# Patient Record
Sex: MALE | Race: WHITE | Employment: FULL TIME | URBAN - METROPOLITAN AREA
[De-identification: names, ages, dates, MRNs, and addresses within clinical notes are randomized per-mention and may not be internally consistent; named-entity substitution may affect disease eponyms.]

---

## 2018-09-11 ENCOUNTER — HOSPITAL ENCOUNTER (EMERGENCY)
Age: 28
Discharge: HOME | End: 2018-09-11
Attending: EMERGENCY MEDICINE

## 2018-09-11 VITALS
HEIGHT: 72 IN | RESPIRATION RATE: 16 BRPM | OXYGEN SATURATION: 96 % | WEIGHT: 315 LBS | TEMPERATURE: 98.5 F | HEART RATE: 92 BPM | DIASTOLIC BLOOD PRESSURE: 80 MMHG | BODY MASS INDEX: 42.66 KG/M2 | SYSTOLIC BLOOD PRESSURE: 172 MMHG

## 2018-09-11 DIAGNOSIS — J06.9 ACUTE UPPER RESPIRATORY INFECTION: Primary | ICD-10-CM

## 2018-09-11 PROCEDURE — 99283 EMERGENCY DEPT VISIT LOW MDM: CPT

## 2018-09-11 RX ORDER — FLUTICASONE PROPIONATE 50 MCG
1 SPRAY, SUSPENSION (ML) NASAL DAILY
Qty: 1 BOTTLE | Refills: 0 | Status: SHIPPED | OUTPATIENT
Start: 2018-09-11 | End: 2019-08-28 | Stop reason: ALTCHOICE

## 2018-09-11 RX ORDER — ECHINACEA PURPUREA EXTRACT 125 MG
1 TABLET ORAL PRN
Qty: 1 BOTTLE | Refills: 3 | Status: SHIPPED | OUTPATIENT
Start: 2018-09-11 | End: 2020-12-13 | Stop reason: ALTCHOICE

## 2018-09-11 RX ORDER — PREDNISONE 10 MG/1
40 TABLET ORAL DAILY
Qty: 20 TABLET | Refills: 0 | Status: SHIPPED | OUTPATIENT
Start: 2018-09-11 | End: 2018-09-16

## 2018-09-11 ASSESSMENT — ENCOUNTER SYMPTOMS
WHEEZING: 1
COUGH: 1
CONSTIPATION: 0
SHORTNESS OF BREATH: 0
VOMITING: 0
ABDOMINAL PAIN: 0
BACK PAIN: 0
DIARRHEA: 0
SORE THROAT: 0
CHEST TIGHTNESS: 0
EYE PAIN: 0
NAUSEA: 0

## 2018-09-11 NOTE — ED NOTES
C= \"Have you ever felt that you should Cut down on your drinking? \"  Yes  A= \"Have people Annoyed you by criticizing your drinking? \"  Yes  G= \"Have you ever felt bad or Guilty about your drinking? \"  Yes  E= \"Have you ever had a drink as an Eye-opener first thing in the morning to steady your nerves or to help a hangover? \"  Yes      Deferred []      Reason for deferring: N/A    *If yes to two or more: probable alcohol abuse. Larance Fleischer, RN  09/11/18 0352

## 2018-09-11 NOTE — ED PROVIDER NOTES
[address] ED  eMERGENCY dEPARTMENT eNCOUnter    Pt Name: Stephenie Jones  MRN: [id number]  [place] 1990  Date of evaluation: 9/11/18  CHIEF COMPLAINT       Chief Complaint   Patient presents with    Cough    Nasal Congestion     HISTORY OF PRESENT ILLNESS   HPI   Patient presenting with 3 days of nasal congestion, mild sore throat, cough productive of scant sputum. Feels intermittent wheezing and using albuterol inhaler at home. Has seasonal allergies and taking Zyrtec. No fevers. No chest pain or SOB. REVIEW OF SYSTEMS     Review of Systems   Constitutional: Negative for chills and fever. HENT: Positive for congestion. Negative for ear pain and sore throat. Eyes: Negative for pain and visual disturbance. Respiratory: Positive for cough and wheezing. Negative for chest tightness and shortness of breath. Cardiovascular: Negative for chest pain and palpitations. Gastrointestinal: Negative for abdominal pain, constipation, diarrhea, nausea and vomiting. Genitourinary: Negative for dysuria and testicular pain. Musculoskeletal: Negative for back pain. Skin: Negative for rash and wound. Neurological: Negative for seizures, syncope and headaches. PAST MEDICAL HISTORY     Past Medical History:   Diagnosis Date    Arthritis      SURGICAL HISTORY     History reviewed. No pertinent surgical history. CURRENT MEDICATIONS       Discharge Medication List as of 9/11/2018  6:50 AM        ALLERGIES     has No Known Allergies. FAMILY HISTORY     has no family status information on file. SOCIAL HISTORY      reports that he has never smoked. He has never used smokeless tobacco. He reports that he does not drink alcohol or use drugs. PHYSICAL EXAM     INITIAL VITALS: BP (!) 172/80   Pulse 92   Temp 98.5 °F (36.9 °C) (Oral)   Resp 16   Ht 6' (1.829 m)   Wt (!) 405 lb (183.7 kg)   SpO2 96%   BMI 54.93 kg/m²    Physical Exam   Constitutional: He is oriented to person, place, and time. Well appearing, resting comfortably in bed. HENT:   Head: Normocephalic and atraumatic. Right Ear: External ear normal.   Left Ear: External ear normal.   Mouth/Throat: Oropharynx is clear and moist.   Boggy nasal turbinates. No sinus tenderness. Eyes: Pupils are equal, round, and reactive to light. Conjunctivae and EOM are normal.   Neck: Normal range of motion. Neck supple. No JVD present. No tracheal deviation present. Cardiovascular: Normal rate, regular rhythm and normal heart sounds. Pulmonary/Chest: Effort normal and breath sounds normal. No stridor. No respiratory distress. He has no wheezes. Abdominal: Soft. Bowel sounds are normal. He exhibits no distension. There is no tenderness. Musculoskeletal: Normal range of motion. He exhibits no edema, tenderness or deformity. Neurological: He is alert and oriented to person, place, and time. No cranial nerve deficit. Coordination normal.   Skin: Skin is warm and dry. Nursing note and vitals reviewed. MEDICAL DECISION MAKING:   Assessment:  Brock Meza is a 33 y.o. male who presents with URI symptoms    Differential Diagnosis: URI, viral syndrome, sinusitis, bronchitis, PNA      ED Course/MDM:   Patient arrived hemodynamically stable and in no acute distress. Patient's previous imaging and studies reviewed. Symptoms consistent with viral URI. Given the length of symptoms and presentation today patient will not require antibiotics at this time. CXR was not necessary given his presentation. Will discharge with appropropriate treatments and recommend PCP follow up with strict return precaution including worsening cough, dyspnea, inability to tolerate PO, visual changes, and neck stiffness. Reasons to return to the ED, potential complications, expected course of the patient's condition, and recommended treatments were discussed with the patient.  The patient was also instructed to follow up with their primary care physician and/or any appropriate specialists. The patient expressed understanding and agreement, and will be discharged in good condition. Procedures    DIAGNOSTIC RESULTS   EKG: All EKG's are interpreted by the Emergency Department Physician who either signs or Co-signs this chart in the absence of a cardiologist.      RADIOLOGY:All plain film, CT, MRI, and formal ultrasound images (except ED bedside ultrasound) are read by the radiologist, see reports below, unless otherwise noted in MDM or here. No orders to display     LABS: All lab results were reviewed by myself, and all abnormals are listed below. Labs Reviewed - No data to display  EMERGENCY DEPARTMENT COURSE:   Vitals:    Vitals:    18 0610   BP: (!) 172/80   Pulse: 92   Resp: 16   Temp: 98.5 °F (36.9 °C)   TempSrc: Oral   SpO2: 96%   Weight: (!) 405 lb (183.7 kg)   Height: 6' (1.829 m)       The patient was given the following medications while in the emergency department:  Orders Placed This Encounter   Medications    fluticasone (FLONASE) 50 MCG/ACT nasal spray     Si spray by Each Nare route daily     Dispense:  1 Bottle     Refill:  0    predniSONE (DELTASONE) 10 MG tablet     Sig: Take 4 tablets by mouth daily for 5 days     Dispense:  20 tablet     Refill:  0    sodium chloride (AFRIN SALINE NASAL MIST) 0.65 % nasal spray     Si spray by Nasal route as needed for Congestion     Dispense:  1 Bottle     Refill:  3     CONSULTS:  None    FINAL IMPRESSION      1. Acute upper respiratory infection          DISPOSITION/PLAN   DISPOSITION Decision To Discharge 2018 06:46:14 AM      PATIENT REFERRED TO:  No follow-up provider specified.   DISCHARGE MEDICATIONS:  Discharge Medication List as of 2018  6:50 AM      START taking these medications    Details   fluticasone (FLONASE) 50 MCG/ACT nasal spray 1 spray by Each Nare route daily, Disp-1 Bottle, R-0Print      predniSONE (DELTASONE) 10 MG tablet Take 4 tablets by mouth

## 2019-08-28 ENCOUNTER — HOSPITAL ENCOUNTER (EMERGENCY)
Age: 29
Discharge: HOME | End: 2019-08-28
Attending: FAMILY MEDICINE
Payer: COMMERCIAL

## 2019-08-28 VITALS
SYSTOLIC BLOOD PRESSURE: 146 MMHG | DIASTOLIC BLOOD PRESSURE: 94 MMHG | RESPIRATION RATE: 18 BRPM | OXYGEN SATURATION: 97 % | BODY MASS INDEX: 42.66 KG/M2 | HEIGHT: 72 IN | TEMPERATURE: 98.8 F | WEIGHT: 315 LBS | HEART RATE: 96 BPM

## 2019-08-28 DIAGNOSIS — J01.41 ACUTE RECURRENT PANSINUSITIS: Primary | ICD-10-CM

## 2019-08-28 PROCEDURE — 99283 EMERGENCY DEPT VISIT LOW MDM: CPT

## 2019-08-28 PROCEDURE — 6370000000 HC RX 637 (ALT 250 FOR IP): Performed by: FAMILY MEDICINE

## 2019-08-28 RX ORDER — AZITHROMYCIN 250 MG/1
500 TABLET, FILM COATED ORAL ONCE
Status: COMPLETED | OUTPATIENT
Start: 2019-08-28 | End: 2019-08-28

## 2019-08-28 RX ORDER — AZITHROMYCIN 250 MG/1
TABLET, FILM COATED ORAL
Qty: 6 TABLET | Refills: 0 | Status: SHIPPED | OUTPATIENT
Start: 2019-08-28 | End: 2019-09-07

## 2019-08-28 RX ORDER — FLUTICASONE PROPIONATE 50 MCG
1 SPRAY, SUSPENSION (ML) NASAL DAILY
Qty: 1 BOTTLE | Refills: 3 | Status: SHIPPED | OUTPATIENT
Start: 2019-08-28 | End: 2020-12-13 | Stop reason: ALTCHOICE

## 2019-08-28 RX ADMIN — AZITHROMYCIN 500 MG: 250 TABLET, FILM COATED ORAL at 22:39

## 2019-08-29 ASSESSMENT — ENCOUNTER SYMPTOMS
SINUS PAIN: 1
SINUS PRESSURE: 1
COUGH: 1

## 2019-08-29 NOTE — ED PROVIDER NOTES
normal.    DIFFERENTIAL DIAGNOSIS:   URI, bronchitis, sinusitis    DIAGNOSTIC RESULTS           RADIOLOGY: non-plain film images(s) such as CT, Ultrasound and MRI are read by the radiologist.  No orders to display       LABS:   Labs Reviewed - No data to display    EMERGENCY DEPARTMENT COURSE:   Vitals:    Vitals:    08/28/19 2207   BP: (!) 146/94   Pulse: 96   Resp: 18   Temp: 98.8 °F (37.1 °C)   TempSrc: Oral   SpO2: 97%   Weight: (!) 405 lb (183.7 kg)   Height: 6' (1.829 m)     Patient history and physical exam taken at bedside, discussed patient's symptoms and exam findings, discussed clinical diagnosis of likely viral sinusitis, extensive discussion with patient and patient given another Y upper respiratory, sinus, bronchitis is more often viral, and this patient seems to get this every year about the same time viral versus allergic. However patient is absolutely convinced that he only gets infections that are responsive to antibiotics, states every year he gets this is always put on antibiotics and is better within 1 day, wise will likely get worse and wind up hospitalized for a few days. Despite me attempting to explain the patient that this absolutely does not sound bacterial sinusitis in nature, I do not believe patient was willing to listen to any medical reason. I did try to explain to patient using OTC product such as Mucinex DM and/or Mucinex D, nasal Flonase or similar, nasal rinses and/or nasal saline spray, and patient states he is tried all these in the past including recently without any relief.   I do offer to give patient a SNAP scription though I strongly believe patient will likely fill this immediately, and after further discussion we will give him first dose in the emergency room, will discharge patient home with prescription for azithromycin though again I advised patient to hold this for 1 to 2 days and only take it if he begins develop fevers or worsens, OTC medications, will refer to

## 2019-11-20 ENCOUNTER — HOSPITAL ENCOUNTER (EMERGENCY)
Age: 29
Discharge: HOME | End: 2019-11-20
Attending: FAMILY MEDICINE
Payer: COMMERCIAL

## 2019-11-20 VITALS
WEIGHT: 315 LBS | OXYGEN SATURATION: 98 % | TEMPERATURE: 98.7 F | DIASTOLIC BLOOD PRESSURE: 73 MMHG | HEART RATE: 84 BPM | HEIGHT: 72 IN | SYSTOLIC BLOOD PRESSURE: 157 MMHG | RESPIRATION RATE: 18 BRPM | BODY MASS INDEX: 42.66 KG/M2

## 2019-11-20 DIAGNOSIS — L02.213 CUTANEOUS ABSCESS OF CHEST WALL: Primary | ICD-10-CM

## 2019-11-20 PROCEDURE — 2500000003 HC RX 250 WO HCPCS: Performed by: FAMILY MEDICINE

## 2019-11-20 PROCEDURE — 10060 I&D ABSCESS SIMPLE/SINGLE: CPT

## 2019-11-20 PROCEDURE — 87205 SMEAR GRAM STAIN: CPT

## 2019-11-20 PROCEDURE — 99282 EMERGENCY DEPT VISIT SF MDM: CPT

## 2019-11-20 PROCEDURE — 6370000000 HC RX 637 (ALT 250 FOR IP): Performed by: FAMILY MEDICINE

## 2019-11-20 PROCEDURE — 87070 CULTURE OTHR SPECIMN AEROBIC: CPT

## 2019-11-20 RX ORDER — DOXYCYCLINE HYCLATE 100 MG
100 TABLET ORAL ONCE
Status: COMPLETED | OUTPATIENT
Start: 2019-11-20 | End: 2019-11-20

## 2019-11-20 RX ORDER — LIDOCAINE HYDROCHLORIDE 10 MG/ML
5 INJECTION, SOLUTION INFILTRATION; PERINEURAL ONCE
Status: COMPLETED | OUTPATIENT
Start: 2019-11-20 | End: 2019-11-20

## 2019-11-20 RX ORDER — DOXYCYCLINE 100 MG/1
100 CAPSULE ORAL 2 TIMES DAILY
Qty: 20 CAPSULE | Refills: 0 | Status: SHIPPED | OUTPATIENT
Start: 2019-11-20 | End: 2019-11-30

## 2019-11-20 RX ADMIN — LIDOCAINE HYDROCHLORIDE 5 ML: 10 INJECTION, SOLUTION INFILTRATION; PERINEURAL at 11:24

## 2019-11-20 RX ADMIN — DOXYCYCLINE HYCLATE 100 MG: 100 TABLET, COATED ORAL at 11:33

## 2019-11-20 ASSESSMENT — PAIN DESCRIPTION - ORIENTATION: ORIENTATION: RIGHT

## 2019-11-20 ASSESSMENT — PAIN DESCRIPTION - DESCRIPTORS: DESCRIPTORS: THROBBING

## 2019-11-20 ASSESSMENT — PAIN DESCRIPTION - PAIN TYPE: TYPE: ACUTE PAIN

## 2019-11-20 ASSESSMENT — PAIN DESCRIPTION - FREQUENCY: FREQUENCY: CONTINUOUS

## 2019-11-20 ASSESSMENT — PAIN SCALES - GENERAL: PAINLEVEL_OUTOF10: 6

## 2019-11-20 ASSESSMENT — PAIN DESCRIPTION - LOCATION: LOCATION: BREAST

## 2019-11-21 LAB
CULTURE: ABNORMAL
DIRECT EXAM: ABNORMAL
DIRECT EXAM: ABNORMAL
Lab: ABNORMAL
SPECIMEN DESCRIPTION: ABNORMAL

## 2020-12-13 ENCOUNTER — APPOINTMENT (OUTPATIENT)
Dept: GENERAL RADIOLOGY | Age: 30
End: 2020-12-13
Payer: COMMERCIAL

## 2020-12-13 ENCOUNTER — HOSPITAL ENCOUNTER (EMERGENCY)
Age: 30
Discharge: HOME | End: 2020-12-13
Attending: FAMILY MEDICINE
Payer: COMMERCIAL

## 2020-12-13 VITALS
HEIGHT: 72 IN | OXYGEN SATURATION: 100 % | RESPIRATION RATE: 18 BRPM | DIASTOLIC BLOOD PRESSURE: 86 MMHG | WEIGHT: 315 LBS | TEMPERATURE: 98.7 F | BODY MASS INDEX: 42.66 KG/M2 | SYSTOLIC BLOOD PRESSURE: 149 MMHG | HEART RATE: 88 BPM

## 2020-12-13 PROCEDURE — 99283 EMERGENCY DEPT VISIT LOW MDM: CPT

## 2020-12-13 PROCEDURE — 73610 X-RAY EXAM OF ANKLE: CPT

## 2020-12-13 PROCEDURE — 96372 THER/PROPH/DIAG INJ SC/IM: CPT

## 2020-12-13 PROCEDURE — 6360000002 HC RX W HCPCS: Performed by: FAMILY MEDICINE

## 2020-12-13 RX ORDER — KETOROLAC TROMETHAMINE 30 MG/ML
30 INJECTION, SOLUTION INTRAMUSCULAR; INTRAVENOUS ONCE
Status: COMPLETED | OUTPATIENT
Start: 2020-12-13 | End: 2020-12-13

## 2020-12-13 RX ADMIN — KETOROLAC TROMETHAMINE 30 MG: 30 INJECTION, SOLUTION INTRAMUSCULAR at 03:19

## 2020-12-13 ASSESSMENT — PAIN DESCRIPTION - PAIN TYPE
TYPE: ACUTE PAIN
TYPE: ACUTE PAIN

## 2020-12-13 ASSESSMENT — PAIN DESCRIPTION - ORIENTATION
ORIENTATION: LEFT
ORIENTATION: LEFT

## 2020-12-13 ASSESSMENT — PAIN DESCRIPTION - LOCATION
LOCATION: ANKLE
LOCATION: ANKLE

## 2020-12-13 ASSESSMENT — PAIN SCALES - GENERAL
PAINLEVEL_OUTOF10: 8
PAINLEVEL_OUTOF10: 8
PAINLEVEL_OUTOF10: 7

## 2020-12-13 ASSESSMENT — PAIN DESCRIPTION - DIRECTION: RADIATING_TOWARDS: NO

## 2020-12-13 ASSESSMENT — PAIN DESCRIPTION - DESCRIPTORS
DESCRIPTORS: THROBBING
DESCRIPTORS: THROBBING

## 2020-12-13 ASSESSMENT — PAIN DESCRIPTION - FREQUENCY: FREQUENCY: CONTINUOUS

## 2020-12-13 NOTE — ED NOTES
Pt high fowlers in bed. Pt given injection for pain. Pt tolerates well. Pt left ankle wrapped with ace bandage. Pt tolerates well. Cap refill <3 sec. Pt denies any numbness or tingling. Pt call light within reach. Kevin Garcia [facility]  12/13/20 1346

## 2020-12-13 NOTE — ED PROVIDER NOTES
12/13/2020   eMERGENCY dEPARTMENT eNCOUnter        [address] [facility]    Chief Complaint   Patient presents with    Ankle Pain     Walking, twisted ankle and heard a loud pop. HPI    Shauna Encarnacion is a 35 y.o. male who presents with left ankle pain after he twisted his left ankle at home today. Symptoms are described as being moderate in severity. REVIEW OF SYSTEMS    All systems are reviewed and positives are in the HPI. PAST MEDICAL HISTORY    Past Medical History:   Diagnosis Date    Arthritis        SURGICAL HISTORY    History reviewed. No pertinent surgical history. CURRENT MEDICATIONS        ALLERGIES    No Known Allergies    FAMILY HISTORY    History reviewed. No pertinent family history.     SOCIAL HISTORY    Social History     Socioeconomic History    Marital status: Single     Spouse name: None    Number of children: None    Years of education: None    Highest education level: None   Occupational History    None   Social Needs    Financial resource strain: None    Food insecurity     Worry: None     Inability: None    Transportation needs     Medical: None     Non-medical: None   Tobacco Use    Smoking status: Never Smoker    Smokeless tobacco: Never Used   Substance and Sexual Activity    Alcohol use: No    Drug use: No    Sexual activity: None   Lifestyle    Physical activity     Days per week: None     Minutes per session: None    Stress: None   Relationships    Social connections     Talks on phone: None     Gets together: None     Attends Sabianist service: None     Active member of club or organization: None     Attends meetings of clubs or organizations: None     Relationship status: None    Intimate partner violence     Fear of current or ex partner: None     Emotionally abused: None     Physically abused: None     Forced sexual activity: None   Other Topics Concern    None   Social History Narrative    None       PHYSICAL EXAM    VITAL SIGNS: BP (!) 149/86 Pulse 88   Temp 98.7 °F (37.1 °C) (Oral)   Resp 18   Ht 6' (1.829 m)   Wt (!) 431 lb 4.8 oz (195.6 kg)   SpO2 100%   BMI 58.49 kg/m²   Constitutional:  Well developed, well nourished, moderate acute distress, non-toxic appearance   HENT:  Atraumatic, external ears normal, nose normal, oropharynx moist.  Neck- normal range of motion, no tenderness, supple   Respiratory:  No respiratory distress, normal breath sounds. Cardiovascular:  Normal rate, normal rhythm, no murmurs, no gallops, no rubs   GI:  Soft, nondistended, normal bowel sounds, nontender   Musculoskeletal: Left ankle tenderness and swelling on lateral aspect. Decreased range of motion due to pain. Integument:  Well hydrated, no rash   Neurologic: Grossly intact    RADIOLOGY/PROCEDURES      X-Ray was negative for fracture. ED COURSE & MEDICAL DECISION MAKING    Pertinent Labs & Imaging studies reviewed. (See chart for details)  Ace wrap applied in the ER. Patient was stable on discharge. Patient received Toradol in the ER with mild relief. FINAL IMPRESSION    1. Left ankle sprain  2. Summation      Patient Course: X-ray was negative for fracture. Patient was stable on discharge. Patient obtained mild relief with Toradol which he received in the ER. ED Medications administered this visit:    Medications   ketorolac (TORADOL) injection 30 mg (30 mg Intramuscular Given 12/13/20 0319)       New Prescriptions from this visit:    New Prescriptions    No medications on file       Follow-up:  Your Physician      As needed        Final Impression:   1.  Sprain of left ankle, unspecified ligament, initial encounter               (Please note that portions of this note were completed with a voice recognition program.  Efforts were made to edit the dictations but occasionally words are mis-transcribed.)     Jolly Moore MD  12/13/20 4818

## 2021-09-12 ENCOUNTER — APPOINTMENT (OUTPATIENT)
Dept: GENERAL RADIOLOGY | Age: 31
End: 2021-09-12
Payer: COMMERCIAL

## 2021-09-12 ENCOUNTER — HOSPITAL ENCOUNTER (EMERGENCY)
Age: 31
Discharge: HOME | End: 2021-09-12
Attending: EMERGENCY MEDICINE
Payer: COMMERCIAL

## 2021-09-12 VITALS
BODY MASS INDEX: 42.66 KG/M2 | WEIGHT: 315 LBS | SYSTOLIC BLOOD PRESSURE: 161 MMHG | HEART RATE: 72 BPM | OXYGEN SATURATION: 100 % | DIASTOLIC BLOOD PRESSURE: 92 MMHG | RESPIRATION RATE: 24 BRPM | HEIGHT: 72 IN | TEMPERATURE: 98.7 F

## 2021-09-12 DIAGNOSIS — U07.1 COVID-19: Primary | ICD-10-CM

## 2021-09-12 LAB
SARS-COV-2, RAPID: DETECTED
SPECIMEN DESCRIPTION: ABNORMAL

## 2021-09-12 PROCEDURE — 71045 X-RAY EXAM CHEST 1 VIEW: CPT

## 2021-09-12 PROCEDURE — 87635 SARS-COV-2 COVID-19 AMP PRB: CPT

## 2021-09-12 PROCEDURE — 99283 EMERGENCY DEPT VISIT LOW MDM: CPT

## 2021-09-12 PROCEDURE — C9803 HOPD COVID-19 SPEC COLLECT: HCPCS

## 2021-09-12 RX ORDER — ALBUTEROL SULFATE 90 UG/1
2 AEROSOL, METERED RESPIRATORY (INHALATION) ONCE
Status: DISCONTINUED | OUTPATIENT
Start: 2021-09-12 | End: 2021-09-12

## 2021-09-12 RX ORDER — PREDNISONE 50 MG/1
TABLET ORAL
Qty: 5 TABLET | Refills: 0 | Status: SHIPPED | OUTPATIENT
Start: 2021-09-12 | End: 2021-10-28 | Stop reason: ALTCHOICE

## 2021-09-12 RX ORDER — KETOROLAC TROMETHAMINE 10 MG/1
10 TABLET, FILM COATED ORAL EVERY 8 HOURS PRN
Qty: 15 TABLET | Refills: 0 | Status: SHIPPED | OUTPATIENT
Start: 2021-09-12 | End: 2021-10-28 | Stop reason: ALTCHOICE

## 2021-09-12 RX ORDER — BROMPHENIRAMINE MALEATE, PSEUDOEPHEDRINE HYDROCHLORIDE, AND DEXTROMETHORPHAN HYDROBROMIDE 2; 30; 10 MG/5ML; MG/5ML; MG/5ML
5 SYRUP ORAL 4 TIMES DAILY PRN
Qty: 240 ML | Refills: 1 | Status: SHIPPED | OUTPATIENT
Start: 2021-09-12 | End: 2021-10-12

## 2021-09-12 RX ORDER — ALBUTEROL SULFATE 90 UG/1
2 AEROSOL, METERED RESPIRATORY (INHALATION) 4 TIMES DAILY PRN
Qty: 18 G | Refills: 0 | Status: SHIPPED | OUTPATIENT
Start: 2021-09-12 | End: 2021-10-28 | Stop reason: SDUPTHER

## 2021-09-12 NOTE — ED PROVIDER NOTES
HPI:  9/12/21,   Time: 11:18 AM EDT         Willian Ansari is a 28 y.o. male presenting to the ED for shortness of breath and cough and congestion and loss of taste and smell, beginning last few days ago. The complaint has been constant, moderate in severity, and worsened by nothing. No alleviating factors. No fever chills or night sweats and no chest pain no abdominal pain or vomiting or diarrhea    ROS:   Pertinent positives and negatives are stated within HPI, all other systems reviewed and are negative.  --------------------------------------------- PAST HISTORY ---------------------------------------------  Past Medical History:  has a past medical history of Arthritis. Past Surgical History:  has no past surgical history on file. Social History:  reports that he has never smoked. He has never used smokeless tobacco. He reports that he does not drink alcohol and does not use drugs. Family History: family history is not on file. The patients home medications have been reviewed. Allergies: Patient has no known allergies. -------------------------------------------------- RESULTS -------------------------------------------------  All laboratory and radiology results have been personally reviewed by myself   LABS:  Results for orders placed or performed during the hospital encounter of 09/12/21   COVID-19, Rapid    Specimen: Nasopharyngeal Swab   Result Value Ref Range    Specimen Description . NASOPHARYNGEAL SWAB     SARS-CoV-2, Rapid DETECTED (A) Not Detected       RADIOLOGY:  Interpreted by Radiologist.  XR CHEST PORTABLE    (Results Pending)       ------------------------- NURSING NOTES AND VITALS REVIEWED ---------------------------   The nursing notes within the ED encounter and vital signs as below have been reviewed.    BP (!) 161/92   Pulse 72   Temp 98.7 °F (37.1 °C) (Oral)   Resp 24   Ht 6' (1.829 m)   Wt (!) 410 lb (186 kg)   SpO2 100%   BMI 55.61 kg/m²   Oxygen Saturation Interpretation: Normal      ---------------------------------------------------PHYSICAL EXAM--------------------------------------      Constitutional/General: Alert and oriented x3, well appearing, non toxic in NAD  Sinuses: Mild lateral maxillary sinus tenderness to palpation  Head: NC/AT  Eyes: PERRL, EOMI  Mouth: Oropharynx clear, handling secretions, no trismus  Neck: Supple, full ROM, no meningeal signs  Pulmonary: Lungs clear to auscultation bilaterally, no wheezes, rales, or rhonchi. Not in respiratory distress  Cardiovascular:  Regular rate and rhythm, no murmurs, gallops, or rubs. 2+ distal pulses  Abdomen: Soft, non tender, non distended,   Extremities: Moves all extremities x 4. Warm and well perfused  Skin: warm and dry without rash  Neurologic: GCS 15,  Psych: Normal Affect      ------------------------------ ED COURSE/MEDICAL DECISION MAKING----------------------  Medications   albuterol sulfate  (90 Base) MCG/ACT inhaler 2 puff (has no administration in time range)         Medical Decision Making:    Cough and congestion and loss of taste rule out Covid and pneumonia    Counseling: The emergency provider has spoken with the patient and discussed todays results, in addition to providing specific details for the plan of care and counseling regarding the diagnosis and prognosis.   Questions are answered at this time and they are agreeable with the plan.      --------------------------------- IMPRESSION AND DISPOSITION ---------------------------------    IMPRESSION  1. COVID-19 New Problem       DISPOSITION  Disposition: Discharge to home  Patient condition is stable                  Cecy Downs MD  09/12/21 0141

## 2021-09-12 NOTE — LETTER
NOTIFICATION RETURN TO WORK / SCHOOL    9/12/2021    Mr. Tyrone Tadeo  [address] [address]      To Whom It May Concern:    Tyrone Tadeo was tested for COVID-19 on 9/12/2021, and the result was positive. Please keep patient in quarantine until 9/22/2021. If there are questions or concerns, please have the patient contact our office.         Sincerely,      Vanda Topete RN

## 2021-09-13 ENCOUNTER — CARE COORDINATION (OUTPATIENT)
Dept: CARE COORDINATION | Age: 31
End: 2021-09-13

## 2021-09-13 NOTE — CARE COORDINATION
Reason For Call Today:  -Attempted to reach [facility]  today for ED Follow Up/ COVID positive follow up   -Unable to reach [facility] today x 2 attempts   -Left a voicemail message with reason for call. Requesting a return phone call back to this [facility] when patient is able to [phone], office hours given.   -Left an additional voicemail message when attempting to reach the 2nd time with contact information provided again.

## 2021-09-27 ENCOUNTER — HOSPITAL ENCOUNTER (EMERGENCY)
Age: 31
Discharge: HOME | End: 2021-09-27
Attending: EMERGENCY MEDICINE
Payer: COMMERCIAL

## 2021-09-27 VITALS
DIASTOLIC BLOOD PRESSURE: 86 MMHG | OXYGEN SATURATION: 98 % | WEIGHT: 315 LBS | SYSTOLIC BLOOD PRESSURE: 160 MMHG | RESPIRATION RATE: 17 BRPM | HEART RATE: 85 BPM | TEMPERATURE: 98.6 F | HEIGHT: 72 IN | BODY MASS INDEX: 42.66 KG/M2

## 2021-09-27 DIAGNOSIS — U07.1 COVID-19 VIRUS INFECTION: ICD-10-CM

## 2021-09-27 DIAGNOSIS — R06.02 SHORTNESS OF BREATH: Primary | ICD-10-CM

## 2021-09-27 LAB
ABSOLUTE EOS #: 0.1 K/UL (ref 0–0.4)
ABSOLUTE IMMATURE GRANULOCYTE: ABNORMAL K/UL (ref 0–0.3)
ABSOLUTE LYMPH #: 1.7 K/UL (ref 1–4.8)
ABSOLUTE MONO #: 0.6 K/UL (ref 0–1)
ANION GAP SERPL CALCULATED.3IONS-SCNC: 11 MMOL/L (ref 9–17)
BASOPHILS # BLD: 1 % (ref 0–2)
BASOPHILS ABSOLUTE: 0.1 K/UL (ref 0–0.2)
BUN BLDV-MCNC: 15 MG/DL (ref 6–20)
BUN/CREAT BLD: 16 (ref 9–20)
CALCIUM SERPL-MCNC: 9.3 MG/DL (ref 8.6–10.4)
CHLORIDE BLD-SCNC: 105 MMOL/L (ref 98–107)
CO2: 24 MMOL/L (ref 20–31)
CREAT SERPL-MCNC: 0.91 MG/DL (ref 0.7–1.2)
DIFFERENTIAL TYPE: ABNORMAL
EOSINOPHILS RELATIVE PERCENT: 1 % (ref 0–5)
GFR AFRICAN AMERICAN: >60 ML/MIN
GFR NON-AFRICAN AMERICAN: >60 ML/MIN
GFR SERPL CREATININE-BSD FRML MDRD: ABNORMAL ML/MIN/{1.73_M2}
GFR SERPL CREATININE-BSD FRML MDRD: ABNORMAL ML/MIN/{1.73_M2}
GLUCOSE BLD-MCNC: 99 MG/DL (ref 70–99)
HCT VFR BLD CALC: 42.7 % (ref 41–53)
HEMOGLOBIN: 14.6 G/DL (ref 13.5–17.5)
IMMATURE GRANULOCYTES: ABNORMAL %
LYMPHOCYTES # BLD: 17 % (ref 13–44)
MCH RBC QN AUTO: 29.4 PG (ref 26–34)
MCHC RBC AUTO-ENTMCNC: 34.3 G/DL (ref 31–37)
MCV RBC AUTO: 85.7 FL (ref 80–100)
MONOCYTES # BLD: 6 % (ref 5–9)
MORPHOLOGY: ABNORMAL
NRBC AUTOMATED: ABNORMAL PER 100 WBC
PDW BLD-RTO: 13.2 % (ref 12.1–15.2)
PLATELET # BLD: 256 K/UL (ref 140–450)
PLATELET ESTIMATE: ABNORMAL
PMV BLD AUTO: ABNORMAL FL (ref 6–12)
POTASSIUM SERPL-SCNC: 5.4 MMOL/L (ref 3.7–5.3)
RBC # BLD: 4.98 M/UL (ref 4.5–5.9)
RBC # BLD: ABNORMAL 10*6/UL
SEG NEUTROPHILS: 75 % (ref 39–75)
SEGMENTED NEUTROPHILS ABSOLUTE COUNT: 7.5 K/UL (ref 2.1–6.5)
SODIUM BLD-SCNC: 140 MMOL/L (ref 135–144)
TROPONIN INTERP: NORMAL
TROPONIN T: NORMAL NG/ML
TROPONIN, HIGH SENSITIVITY: <6 NG/L (ref 0–22)
WBC # BLD: 10 K/UL (ref 3.5–11)
WBC # BLD: ABNORMAL 10*3/UL

## 2021-09-27 PROCEDURE — 85025 COMPLETE CBC W/AUTO DIFF WBC: CPT

## 2021-09-27 PROCEDURE — 84484 ASSAY OF TROPONIN QUANT: CPT

## 2021-09-27 PROCEDURE — 99283 EMERGENCY DEPT VISIT LOW MDM: CPT

## 2021-09-27 PROCEDURE — 93005 ELECTROCARDIOGRAM TRACING: CPT | Performed by: EMERGENCY MEDICINE

## 2021-09-27 PROCEDURE — 80048 BASIC METABOLIC PNL TOTAL CA: CPT

## 2021-09-27 ASSESSMENT — PAIN DESCRIPTION - ONSET: ONSET: PROGRESSIVE

## 2021-09-27 ASSESSMENT — PAIN SCALES - GENERAL: PAINLEVEL_OUTOF10: 7

## 2021-09-27 ASSESSMENT — PAIN DESCRIPTION - PAIN TYPE: TYPE: ACUTE PAIN

## 2021-09-27 ASSESSMENT — PAIN DESCRIPTION - ORIENTATION: ORIENTATION: MID

## 2021-09-27 ASSESSMENT — PAIN DESCRIPTION - DESCRIPTORS: DESCRIPTORS: SHARP;STABBING

## 2021-09-27 ASSESSMENT — PAIN DESCRIPTION - FREQUENCY: FREQUENCY: CONTINUOUS

## 2021-09-27 ASSESSMENT — PAIN DESCRIPTION - LOCATION: LOCATION: CHEST

## 2021-09-27 NOTE — LETTER
[facility] ED  [address] [facility] [address]  Phone: [phone]               September 27, 2021    Patient: Gay Watson   YOB: 1990   Date of Visit: 9/27/2021       To Whom It May Concern:    Gay Watson was seen and treated in our emergency department on 9/27/2021. He may return to work on 09/30/2021.       Sincerely,       Romy Johnson RN         Signature:__________________________________

## 2021-09-28 LAB
EKG ATRIAL RATE: 80 BPM
EKG P AXIS: 74 DEGREES
EKG P-R INTERVAL: 178 MS
EKG Q-T INTERVAL: 366 MS
EKG QRS DURATION: 90 MS
EKG QTC CALCULATION (BAZETT): 422 MS
EKG R AXIS: 1 DEGREES
EKG T AXIS: 26 DEGREES
EKG VENTRICULAR RATE: 80 BPM

## 2021-09-28 PROCEDURE — 93010 ELECTROCARDIOGRAM REPORT: CPT | Performed by: INTERNAL MEDICINE

## 2021-09-28 NOTE — ED PROVIDER NOTES
EMERGENCY DEPARTMENT ENCOUNTER      CHIEF COMPLAINT    Chief Complaint   Patient presents with    Chest Pain     Pt c/o progessive chest pressure x2 weeks    Shortness of Breath     Pt c/o worsening SOB x2 weeks        HPI    Junior You is a 28 y.o. male who presentsto ED from home. By car. With complaint of shortness of breath. Onset x2 weeks. Patient was diagnosed with Covid 2 weeks ago. Patient was out of the quarantine and went to work tonight. Patient states that she was not able to finish his shift. Patient states that he needs to take deep breaths frequently. Patient denies fever denies chills. Patient was previously vaccinated for COVID-19 on [employer] vaccine      PAST MEDICAL HISTORY    Past Medical History:   Diagnosis Date    Arthritis        SURGICAL HISTORY    Past Surgical History:   Procedure Laterality Date    TONSILLECTOMY         CURRENT MEDICATIONS    Current Outpatient Rx   Medication Sig Dispense Refill    albuterol sulfate HFA (VENTOLIN HFA) 108 (90 Base) MCG/ACT inhaler Inhale 2 puffs into the lungs 4 times daily as needed for Wheezing 18 g 0    ketorolac (TORADOL) 10 MG tablet Take 1 tablet by mouth every 8 hours as needed for Pain (Patient not taking: Reported on 9/27/2021) 15 tablet 0    brompheniramine-pseudoephedrine-DM (BROMFED DM) 2-30-10 MG/5ML syrup Take 5 mLs by mouth 4 times daily as needed for Congestion or Cough 240 mL 1    predniSONE (DELTASONE) 50 MG tablet Take 50mg po qd x 5 days  QS for 5 days (Patient not taking: Reported on 9/27/2021) 5 tablet 0       ALLERGIES    No Known Allergies    FAMILY HISTORY    History reviewed. No pertinent family history.     SOCIAL HISTORY    Social History     Socioeconomic History    Marital status: Single     Spouse name: None    Number of children: None    Years of education: None    Highest education level: None   Occupational History    None   Tobacco Use    Smoking status: Never Smoker    Smokeless tobacco: Never Used   Vaping Use    Vaping Use: Never used   Substance and Sexual Activity    Alcohol use: No    Drug use: No    Sexual activity: None   Other Topics Concern    None   Social History Narrative    None     Social Determinants of Health     Financial Resource Strain:     Difficulty of Paying Living Expenses:    Food Insecurity:     Worried About Running Out of Food in the Last Year:     [address] in the Last Year:    Transportation Needs:     Lack of Transportation (Medical):  Lack of Transportation (Non-Medical):    Physical Activity:     Days of Exercise per Week:     Minutes of Exercise per Session:    Stress:     Feeling of Stress :    Social Connections:     Frequency of Communication with Friends and Family:     Frequency of Social Gatherings with Friends and Family:     Attends [employer] Services:     Active Member of Clubs or Organizations:     Attends Club or Organization Meetings:     Marital Status:    Intimate Partner Violence:     Fear of Current or Ex-Partner:     Emotionally Abused:     Physically Abused:     Sexually Abused:            Review of Systems:  Constitutional: Positive for shortness of breath and fatigue   eyes:  Denies photophobia or discharge   HENT:  Denies sore throat or ear pain   Respiratory:  Denies cough or shortness of breath   Cardiovascular:  Denies chest pain, palpitations or swelling   GI:  Denies abdominal pain, nausea, vomiting, or diarrhea   Musculoskeletal:  Denies back pain   Skin:  Denies rash   Neurologic:  Denies headache, focal weakness or sensory changes   Endocrine:  Denies polyuria or polydypsia   Lymphatic:  Denies swollen glands   Psychiatric:  Denies depression, suicidal ideation or homicidal ideation   All systems negative except as marked.      PHYSICAL EXAM    VITAL SIGNS: BP (!) 160/86   Pulse 85   Temp 98.6 °F (37 °C) (Oral)   Resp 17   Ht 6' (1.829 m)   Wt (!) 428 lb (194.1 kg)   SpO2 98%   BMI 58.05 kg/m² Constitutional:  Well developed, Well nourished, No acute distress, Non-toxic appearance. High BMI  HENT:  Normocephalic, Atraumatic, Bilateral external ears normal, Oropharynx moist, No oral exudates, Nose normal. Neck- Normal range of motion, No tenderness, Supple, No stridor. Eyes:  PERRL, EOMI, Conjunctiva normal, No discharge. Respiratory:  Normal breath sounds, No respiratory distress, No wheezing, No chest tenderness. Cardiovascular:  Normal heart rate, Normal rhythm, No murmurs, No rubs, No gallops. GI:  Bowel sounds normal, Soft, No tenderness, No masses, No pulsatile masses. : External genitalia appear normal, No masses or lesions. No discharge. No CVA tenderness. Musculoskeletal:  Intact distal pulses, No edema, No tenderness, No cyanosis, No clubbing. Good range of motion in all major joints. No tenderness to palpation or major deformities noted. Back- No tenderness. Integument:  Warm, Dry, No erythema, No rash. Lymphatic:  No lymphadenopathy noted. Neurologic:  Alert & oriented x 3, Normal motor function, Normal sensory function, No focal deficits noted. Psychiatric:  Affect normal, Judgment normal, Mood normal.     EKG        RADIOLOGY    No orders to display       PROCEDURES        Labs  Labs Reviewed   CBC WITH AUTO DIFFERENTIAL   BASIC METABOLIC PANEL   TROPONIN             Summation      Patient Course: Patient oxygen saturation is 98% on room air. Patient's lungs are clear to auscultation bilaterally. Patient will be given a note for work for additional 3 days off. The warning signs were discussed. Return to ED if worse. ED Medications administered this visit:  Medications - No data to display    New Prescriptions from this visit:    New Prescriptions    No medications on file       Follow-up:  [facility]  [address] [facility] [address] [phone]    As needed, If symptoms worsen        Final Impression:   1. Shortness of breath    2. COVID-19 virus infection               (Please note that portions of this note were completed with a voice recognition program.  Efforts were made to edit the dictations but occasionally words are mis-transcribed.)          Doug Gomez MD  09/27/21 4242

## 2021-10-14 ENCOUNTER — OFFICE VISIT (OUTPATIENT)
Dept: FAMILY MEDICINE CLINIC | Age: 31
End: 2021-10-14
Payer: COMMERCIAL

## 2021-10-14 VITALS
BODY MASS INDEX: 42.66 KG/M2 | OXYGEN SATURATION: 97 % | DIASTOLIC BLOOD PRESSURE: 82 MMHG | RESPIRATION RATE: 18 BRPM | SYSTOLIC BLOOD PRESSURE: 136 MMHG | WEIGHT: 315 LBS | HEART RATE: 92 BPM | HEIGHT: 72 IN

## 2021-10-14 DIAGNOSIS — F41.9 ANXIETY: Primary | ICD-10-CM

## 2021-10-14 DIAGNOSIS — Z13.220 LIPID SCREENING: ICD-10-CM

## 2021-10-14 DIAGNOSIS — E66.01 MORBID OBESITY (HCC): ICD-10-CM

## 2021-10-14 DIAGNOSIS — H60.502 ACUTE OTITIS EXTERNA OF LEFT EAR, UNSPECIFIED TYPE: ICD-10-CM

## 2021-10-14 DIAGNOSIS — Z13.1 DIABETES MELLITUS SCREENING: ICD-10-CM

## 2021-10-14 DIAGNOSIS — U07.1 COVID-19 VIRUS INFECTION: ICD-10-CM

## 2021-10-14 DIAGNOSIS — G47.30 SLEEP APNEA, UNSPECIFIED TYPE: ICD-10-CM

## 2021-10-14 DIAGNOSIS — G47.00 INSOMNIA, UNSPECIFIED TYPE: ICD-10-CM

## 2021-10-14 PROCEDURE — G8427 DOCREV CUR MEDS BY ELIG CLIN: HCPCS | Performed by: INTERNAL MEDICINE

## 2021-10-14 PROCEDURE — 1036F TOBACCO NON-USER: CPT | Performed by: INTERNAL MEDICINE

## 2021-10-14 PROCEDURE — G8417 CALC BMI ABV UP PARAM F/U: HCPCS | Performed by: INTERNAL MEDICINE

## 2021-10-14 PROCEDURE — 4130F TOPICAL PREP RX AOE: CPT | Performed by: INTERNAL MEDICINE

## 2021-10-14 PROCEDURE — 99204 OFFICE O/P NEW MOD 45 MIN: CPT | Performed by: INTERNAL MEDICINE

## 2021-10-14 PROCEDURE — G8484 FLU IMMUNIZE NO ADMIN: HCPCS | Performed by: INTERNAL MEDICINE

## 2021-10-14 RX ORDER — AMOXICILLIN AND CLAVULANATE POTASSIUM 875; 125 MG/1; MG/1
1 TABLET, FILM COATED ORAL 2 TIMES DAILY
Qty: 10 TABLET | Refills: 0 | Status: SHIPPED | OUTPATIENT
Start: 2021-10-14 | End: 2021-10-19

## 2021-10-14 RX ORDER — HYDROXYZINE PAMOATE 25 MG/1
25 CAPSULE ORAL 3 TIMES DAILY PRN
Qty: 90 CAPSULE | Refills: 1 | Status: SHIPPED | OUTPATIENT
Start: 2021-10-14 | End: 2021-10-28

## 2021-10-14 RX ORDER — BUSPIRONE HYDROCHLORIDE 10 MG/1
10 TABLET ORAL 2 TIMES DAILY
Qty: 60 TABLET | Refills: 5 | Status: SHIPPED | OUTPATIENT
Start: 2021-10-14 | End: 2021-11-13

## 2021-10-14 RX ORDER — TRAZODONE HYDROCHLORIDE 100 MG/1
100 TABLET ORAL NIGHTLY
Qty: 30 TABLET | Refills: 3 | Status: SHIPPED | OUTPATIENT
Start: 2021-10-14 | End: 2022-01-28 | Stop reason: SDUPTHER

## 2021-10-14 SDOH — ECONOMIC STABILITY: FOOD INSECURITY: WITHIN THE PAST 12 MONTHS, YOU WORRIED THAT YOUR FOOD WOULD RUN OUT BEFORE YOU GOT MONEY TO BUY MORE.: NEVER TRUE

## 2021-10-14 SDOH — ECONOMIC STABILITY: FOOD INSECURITY: WITHIN THE PAST 12 MONTHS, THE FOOD YOU BOUGHT JUST DIDN'T LAST AND YOU DIDN'T HAVE MONEY TO GET MORE.: NEVER TRUE

## 2021-10-14 ASSESSMENT — ENCOUNTER SYMPTOMS
ALLERGIC/IMMUNOLOGIC NEGATIVE: 1
WHEEZING: 0
CHEST TIGHTNESS: 0
NAUSEA: 0
SORE THROAT: 0
BLOOD IN STOOL: 0
SHORTNESS OF BREATH: 0
COUGH: 0
SINUS PAIN: 0
BACK PAIN: 0
ABDOMINAL PAIN: 0
CONSTIPATION: 0

## 2021-10-14 ASSESSMENT — PATIENT HEALTH QUESTIONNAIRE - PHQ9
2. FEELING DOWN, DEPRESSED OR HOPELESS: 0
SUM OF ALL RESPONSES TO PHQ9 QUESTIONS 1 & 2: 0
SUM OF ALL RESPONSES TO PHQ QUESTIONS 1-9: 0
1. LITTLE INTEREST OR PLEASURE IN DOING THINGS: 0

## 2021-10-14 ASSESSMENT — SOCIAL DETERMINANTS OF HEALTH (SDOH): HOW HARD IS IT FOR YOU TO PAY FOR THE VERY BASICS LIKE FOOD, HOUSING, MEDICAL CARE, AND HEATING?: NOT HARD AT ALL

## 2021-10-14 NOTE — PROGRESS NOTES
HPI Notes    Name: Shyam Ocasio  : 1990         Chief Complaint:     Chief Complaint   Patient presents with    New Patient     Establish patient. F/u from having COVID    Anxiety    Insomnia    Otalgia       History of Present Illness:        Shyam Ocasio is a new patient. He presents to office to establish care. Has a h/o anxiety, insomnia, MURTAZA. Used to be on medications many years ago. He feels that the symptoms are worsening and he needs to get back on his meds. Also complains of left ear fullness/fluid    Radha Templeton presents with the complaint of anxiety. Current symptoms of anxiety include feelings of losing control, insomnia, irritable, palpitations, racing thoughts. Radha Templeton has felt anxious  for many years . Radha Templeton does have a history of anxiety or depression. Current identifiable stressors include his girlfriend who has 3 young children, financial issues. Radha Templeton  has a  sleep disturbance and sleeps about 2-3 hours per night. Radha Templeton denies the use of drugs or alcohol. Not a smoker. Radha Templeton denies depression. Went to a counselor and was prescribed some pills , ? Ninoska Diaz presents to the office complaining of frequent night time awakening, difficulty falling asleep and non-restful sleep. This has been occuring for 2-3 years. Associated symptoms include anxiety, irritability and stress. Carlos averages 2-3 hours of sleep per night. Patient has not attempted self treatment. Brody Salinas does not take a daily nap. Caffeine is  avoided in the afternoon. He would like to be tested for DM, cholesterol     Was diagnosed with COVID-19 infection on 2021. States SOB is getting better     Has a h/o untreated MURTAZA. States many years ago had a sleep study in [facility] but did not follow up for CPAP set up. He would like to be retested. Otalgia   There is pain in the left ear. This is a new problem.  The current episode started 1 to 4 weeks ago. The problem occurs constantly. The problem has been unchanged. There has been no fever. The pain is mild. Associated symptoms include ear discharge. Pertinent negatives include no abdominal pain, coughing, headaches, hearing loss, rash or sore throat. He has tried nothing for the symptoms. The treatment provided no relief. Past Medical History:     Past Medical History:   Diagnosis Date    Arthritis       Reviewed all health maintenance requirements and orderedappropriate tests  Health Maintenance Due   Topic Date Due    Flu vaccine (1) 09/01/2021       Past Surgical History:     Past Surgical History:   Procedure Laterality Date    TONSILLECTOMY          Medications:       Prior to Admission medications    Medication Sig Start Date End Date Taking? Authorizing Provider   busPIRone (BUSPAR) 10 MG tablet Take 1 tablet by mouth 2 times daily 10/14/21 11/13/21 Yes Giovani Martines MD   hydrOXYzine (VISTARIL) 25 MG capsule Take 1 capsule by mouth 3 times daily as needed for Anxiety 10/14/21 10/28/21 Yes Giovani Martines MD   traZODone (DESYREL) 100 MG tablet Take 1 tablet by mouth nightly 10/14/21  Yes Giovani Martines MD   amoxicillin-clavulanate (AUGMENTIN) 875-125 MG per tablet Take 1 tablet by mouth 2 times daily for 5 days 10/14/21 10/19/21 Yes Giovani Martines MD   albuterol sulfate HFA (VENTOLIN HFA) 108 (90 Base) MCG/ACT inhaler Inhale 2 puffs into the lungs 4 times daily as needed for Wheezing 9/12/21  Yes Karol Beebe MD   ketorolac (TORADOL) 10 MG tablet Take 1 tablet by mouth every 8 hours as needed for Pain  Patient not taking: Reported on 9/27/2021 9/12/21   Karol Beebe MD   predniSONE (DELTASONE) 50 MG tablet Take 50mg po qd x 5 days  QS for 5 days  Patient not taking: Reported on 9/27/2021 9/12/21   Karol Beebe MD        Allergies:       Patient has no known allergies. Social History:     Tobacco: reports that he has never smoked.  He has never used smokeless tobacco.  Alcohol:      reports no history of alcohol use. Drug Use:  reports no history of drug use. Family History:     No family history on file. Review of Systems:         Review of Systems   Constitutional: Negative for activity change, appetite change, fatigue and unexpected weight change. HENT: Positive for ear discharge. Negative for congestion, ear pain, hearing loss, sinus pain and sore throat. Left ear fullness, \"fluid\"   Eyes: Negative for visual disturbance. Respiratory: Negative for cough, chest tightness, shortness of breath and wheezing. Cardiovascular: Negative for chest pain, palpitations and leg swelling. Gastrointestinal: Negative for abdominal pain, blood in stool, constipation and nausea. Endocrine: Negative for cold intolerance, heat intolerance, polydipsia and polyuria. Genitourinary: Negative for difficulty urinating and dysuria. Musculoskeletal: Negative for arthralgias and back pain. Skin: Negative for rash. Allergic/Immunologic: Negative. Neurological: Negative for dizziness, weakness and headaches. Psychiatric/Behavioral: Negative for behavioral problems, hallucinations, sleep disturbance and suicidal ideas. The patient is nervous/anxious. Physical Exam:     Vitals:  /82   Pulse 92   Resp 18   Ht 6' (1.829 m)   Wt (!) 426 lb (193.2 kg)   SpO2 97%   BMI 57.78 kg/m²       Physical Exam  Vitals reviewed. Constitutional:       General: He is not in acute distress. Appearance: He is well-developed. He is obese. HENT:      Head: Normocephalic and atraumatic. Right Ear: Tympanic membrane, ear canal and external ear normal.      Left Ear: Tympanic membrane and external ear normal.      Ears:      Comments: Left ear canal erythematous, swollen, small amount of yellowish material noted     Nose: Nose normal. No congestion or rhinorrhea.       Mouth/Throat:      Mouth: Mucous membranes are moist.      Pharynx: Oropharynx is clear. No oropharyngeal exudate or posterior oropharyngeal erythema. Eyes:      General: No scleral icterus. Right eye: No discharge. Left eye: No discharge. Conjunctiva/sclera: Conjunctivae normal.   Neck:      Thyroid: No thyromegaly. Cardiovascular:      Rate and Rhythm: Normal rate and regular rhythm. Heart sounds: Normal heart sounds. No murmur heard. Pulmonary:      Effort: Pulmonary effort is normal.      Breath sounds: Normal breath sounds. No wheezing, rhonchi or rales. Abdominal:      General: Bowel sounds are normal. There is no distension. Palpations: Abdomen is soft. There is no mass. Tenderness: There is no abdominal tenderness. Musculoskeletal:         General: No tenderness. Normal range of motion. Right lower leg: No edema. Left lower leg: No edema. Lymphadenopathy:      Cervical: No cervical adenopathy. Skin:     General: Skin is warm and dry. Coloration: Skin is not jaundiced or pale. Findings: No rash. Neurological:      General: No focal deficit present. Mental Status: He is alert and oriented to person, place, and time. Mental status is at baseline. Cranial Nerves: No cranial nerve deficit. Sensory: No sensory deficit. Motor: No weakness. Coordination: Coordination normal.      Gait: Gait normal.   Psychiatric:         Mood and Affect: Mood normal.         Behavior: Behavior normal.         Thought Content:  Thought content normal.         Judgment: Judgment normal.               Data:     Lab Results   Component Value Date     09/27/2021    K 5.4 09/27/2021     09/27/2021    CO2 24 09/27/2021    BUN 15 09/27/2021    CREATININE 0.91 09/27/2021    GLUCOSE 99 09/27/2021    PROT 6.8 11/11/2016    LABALBU 4.5 11/11/2016    BILITOT 0.57 11/11/2016    ALKPHOS 87 11/11/2016    AST 19 11/11/2016    ALT 21 11/11/2016     Lab Results   Component Value Date    WBC 10.0 09/27/2021    RBC 4.98 09/27/2021    HGB 14.6 09/27/2021    HCT 42.7 09/27/2021    MCV 85.7 09/27/2021    MCH 29.4 09/27/2021    MCHC 34.3 09/27/2021    RDW 13.2 09/27/2021     09/27/2021    MPV NOT REPORTED 09/27/2021     Lab Results   Component Value Date    TSH 1.53 11/11/2016     Lab Results   Component Value Date    CHOL 152 11/11/2016    HDL 27 11/11/2016    LABA1C 4.7 10/15/2015          Assessment & Plan        Diagnosis Orders   1. Anxiety   Patient requested BuSpar and Vistaril combination since he had success controlling his anxiety in the past. Started on BuSpar 10 mg twice daily, Vistaril 25 mg 3 times daily as needed. Patient is asked to follow-up in 2 weeks busPIRone (BUSPAR) 10 MG tablet    hydrOXYzine (VISTARIL) 25 MG capsule   2. Acute otitis externa of left ear, unspecified type  Prescribed Augmentin course amoxicillin-clavulanate (AUGMENTIN) 875-125 MG per tablet   3. Insomnia, unspecified type   Prescribed trazodone 100 mg nightly. Follow-up in 2 weeks traZODone (DESYREL) 100 MG tablet   4. Sleep apnea, unspecified type   Refer for sleep study Sleep Study with PAP Titration   5. Diabetes mellitus screening   Check BMP Basic Metabolic Panel   6. Lipid screening   Check lipid panel Lipid Panel   7. Morbid obesity ([facility] [address]       8. COVID-19 virus infection   Patient reports feeling better                    Completed Refills   Requested Prescriptions     Signed Prescriptions Disp Refills    busPIRone (BUSPAR) 10 MG tablet 60 tablet 5     Sig: Take 1 tablet by mouth 2 times daily    hydrOXYzine (VISTARIL) 25 MG capsule 90 capsule 1     Sig: Take 1 capsule by mouth 3 times daily as needed for Anxiety    traZODone (DESYREL) 100 MG tablet 30 tablet 3     Sig: Take 1 tablet by mouth nightly    amoxicillin-clavulanate (AUGMENTIN) 875-125 MG per tablet 10 tablet 0     Sig: Take 1 tablet by mouth 2 times daily for 5 days     Return in about 2 weeks (around 10/28/2021), or insomnia, for anxiety.      Orders Placed This Encounter   Medications    busPIRone (BUSPAR) 10 MG tablet     Sig: Take 1 tablet by mouth 2 times daily     Dispense:  60 tablet     Refill:  5    hydrOXYzine (VISTARIL) 25 MG capsule     Sig: Take 1 capsule by mouth 3 times daily as needed for Anxiety     Dispense:  90 capsule     Refill:  1    traZODone (DESYREL) 100 MG tablet     Sig: Take 1 tablet by mouth nightly     Dispense:  30 tablet     Refill:  3    amoxicillin-clavulanate (AUGMENTIN) 875-125 MG per tablet     Sig: Take 1 tablet by mouth 2 times daily for 5 days     Dispense:  10 tablet     Refill:  0     Orders Placed This Encounter   Procedures    Basic Metabolic Panel     Standing Status:   Future     Standing Expiration Date:   10/14/2022    Lipid Panel     Standing Status:   Future     Standing Expiration Date:   10/14/2022     Order Specific Question:   Is Patient Fasting?/# of Hours     Answer:   8 hrs    Sleep Study with PAP Titration     Standing Status:   Future     Standing Expiration Date:   4/14/2022     Order Specific Question:   Sleep Study Titration Type     Answer:   CPAP     Order Specific Question:   Location For Sleep Study     Answer:   [facility]     Order Specific Question:   Select a sleep lab location     Answer:   [facility]         There are no Patient Instructions on file for this visit.     Electronically signed by Sergio Pace MD on 10/14/2021 at 1:43 PM           Completed Refills      Requested Prescriptions     Signed Prescriptions Disp Refills    busPIRone (BUSPAR) 10 MG tablet 60 tablet 5     Sig: Take 1 tablet by mouth 2 times daily    hydrOXYzine (VISTARIL) 25 MG capsule 90 capsule 1     Sig: Take 1 capsule by mouth 3 times daily as needed for Anxiety    traZODone (DESYREL) 100 MG tablet 30 tablet 3     Sig: Take 1 tablet by mouth nightly    amoxicillin-clavulanate (AUGMENTIN) 875-125 MG per tablet 10 tablet 0     Sig: Take 1 tablet by mouth 2 times daily for 5 days

## 2021-10-21 DIAGNOSIS — G47.33 OSA (OBSTRUCTIVE SLEEP APNEA): Primary | ICD-10-CM

## 2021-10-26 ENCOUNTER — HOSPITAL ENCOUNTER (OUTPATIENT)
Age: 31
Discharge: HOME | End: 2021-10-26
Payer: COMMERCIAL

## 2021-10-26 DIAGNOSIS — Z13.220 LIPID SCREENING: ICD-10-CM

## 2021-10-26 DIAGNOSIS — Z13.1 DIABETES MELLITUS SCREENING: ICD-10-CM

## 2021-10-26 LAB
ANION GAP SERPL CALCULATED.3IONS-SCNC: 10 MMOL/L (ref 9–17)
BUN BLDV-MCNC: 10 MG/DL (ref 6–20)
BUN/CREAT BLD: 10 (ref 9–20)
CALCIUM SERPL-MCNC: 9.1 MG/DL (ref 8.6–10.4)
CHLORIDE BLD-SCNC: 104 MMOL/L (ref 98–107)
CHOLESTEROL/HDL RATIO: 5.2
CHOLESTEROL: 140 MG/DL
CO2: 25 MMOL/L (ref 20–31)
CREAT SERPL-MCNC: 0.99 MG/DL (ref 0.7–1.2)
GFR AFRICAN AMERICAN: >60 ML/MIN
GFR NON-AFRICAN AMERICAN: >60 ML/MIN
GFR SERPL CREATININE-BSD FRML MDRD: ABNORMAL ML/MIN/{1.73_M2}
GFR SERPL CREATININE-BSD FRML MDRD: ABNORMAL ML/MIN/{1.73_M2}
GLUCOSE BLD-MCNC: 108 MG/DL (ref 70–99)
HDLC SERPL-MCNC: 27 MG/DL
LDL CHOLESTEROL: 93 MG/DL (ref 0–130)
PATIENT FASTING?: YES
POTASSIUM SERPL-SCNC: 4.4 MMOL/L (ref 3.7–5.3)
SODIUM BLD-SCNC: 139 MMOL/L (ref 135–144)
TRIGL SERPL-MCNC: 101 MG/DL
VLDLC SERPL CALC-MCNC: ABNORMAL MG/DL (ref 1–30)

## 2021-10-26 PROCEDURE — 36415 COLL VENOUS BLD VENIPUNCTURE: CPT

## 2021-10-26 PROCEDURE — 80048 BASIC METABOLIC PNL TOTAL CA: CPT

## 2021-10-26 PROCEDURE — 80061 LIPID PANEL: CPT

## 2021-10-28 ENCOUNTER — OFFICE VISIT (OUTPATIENT)
Dept: FAMILY MEDICINE CLINIC | Age: 31
End: 2021-10-28
Payer: COMMERCIAL

## 2021-10-28 VITALS
TEMPERATURE: 98.6 F | BODY MASS INDEX: 42.66 KG/M2 | HEIGHT: 72 IN | RESPIRATION RATE: 18 BRPM | WEIGHT: 315 LBS | DIASTOLIC BLOOD PRESSURE: 88 MMHG | HEART RATE: 76 BPM | SYSTOLIC BLOOD PRESSURE: 136 MMHG | OXYGEN SATURATION: 98 %

## 2021-10-28 DIAGNOSIS — J30.2 SEASONAL ALLERGIES: ICD-10-CM

## 2021-10-28 DIAGNOSIS — F41.9 ANXIETY: Primary | ICD-10-CM

## 2021-10-28 PROCEDURE — G8427 DOCREV CUR MEDS BY ELIG CLIN: HCPCS | Performed by: INTERNAL MEDICINE

## 2021-10-28 PROCEDURE — 1036F TOBACCO NON-USER: CPT | Performed by: INTERNAL MEDICINE

## 2021-10-28 PROCEDURE — 99213 OFFICE O/P EST LOW 20 MIN: CPT | Performed by: INTERNAL MEDICINE

## 2021-10-28 PROCEDURE — G8484 FLU IMMUNIZE NO ADMIN: HCPCS | Performed by: INTERNAL MEDICINE

## 2021-10-28 PROCEDURE — G8417 CALC BMI ABV UP PARAM F/U: HCPCS | Performed by: INTERNAL MEDICINE

## 2021-10-28 RX ORDER — CETIRIZINE HYDROCHLORIDE 10 MG/1
10 TABLET ORAL DAILY
Qty: 30 TABLET | Refills: 0 | Status: SHIPPED | OUTPATIENT
Start: 2021-10-28 | End: 2021-11-27

## 2021-10-28 RX ORDER — ALBUTEROL SULFATE 2.5 MG/3ML
2.5 SOLUTION RESPIRATORY (INHALATION) EVERY 4 HOURS PRN
Qty: 120 EACH | Refills: 3 | Status: SHIPPED | OUTPATIENT
Start: 2021-10-28

## 2021-10-28 RX ORDER — ALBUTEROL SULFATE 90 UG/1
2 AEROSOL, METERED RESPIRATORY (INHALATION) 4 TIMES DAILY PRN
Qty: 18 G | Refills: 0 | Status: SHIPPED | OUTPATIENT
Start: 2021-10-28 | End: 2022-01-28 | Stop reason: SDUPTHER

## 2021-10-28 RX ORDER — FLUTICASONE PROPIONATE 50 MCG
2 SPRAY, SUSPENSION (ML) NASAL DAILY
Qty: 16 G | Refills: 3 | Status: SHIPPED | OUTPATIENT
Start: 2021-10-28 | End: 2022-01-28 | Stop reason: SDUPTHER

## 2021-10-28 ASSESSMENT — ENCOUNTER SYMPTOMS
SINUS COMPLAINT: 1
ABDOMINAL PAIN: 0
HOARSE VOICE: 0
RHINORRHEA: 1
BLOOD IN STOOL: 0
NAUSEA: 0
SINUS PRESSURE: 1
TROUBLE SWALLOWING: 0
SORE THROAT: 0
WHEEZING: 0
FACIAL SWELLING: 1
ALLERGIC/IMMUNOLOGIC NEGATIVE: 1
COUGH: 0
CONSTIPATION: 0
SHORTNESS OF BREATH: 1
SINUS PAIN: 0

## 2021-10-28 NOTE — PROGRESS NOTES
HPI Notes    Name: Daniel Thomason  : 1990         Chief Complaint:     Chief Complaint   Patient presents with    Anxiety     2 week. Medication helping. History of Present Illness:        Lilia Barnett presents to office to follow up for anxiety. Lilia Barnett presents as a follow up on anxiety. Current medication for anxiety includes Buspar and Trazodone  . Lilia Barnett has been on this medication for 2 weeks. .  The medication is  being tolerated well. Since starting the medication the symptoms of anxiety have significantly improved. Lilia Barnett  is not in counciling. Today c/o sinus congestion. Sinus Problem  This is a recurrent problem. The current episode started in the past 7 days. The problem is unchanged. There has been no fever. He is experiencing no pain. Associated symptoms include congestion, shortness of breath and sinus pressure. Pertinent negatives include no chills, coughing, ear pain, headaches, hoarse voice or sore throat. Past treatments include nothing. Past Medical History:     Past Medical History:   Diagnosis Date    Arthritis       Reviewed all health maintenance requirements and orderedappropriate tests  Health Maintenance Due   Topic Date Due    Flu vaccine (1) 2021       Past Surgical History:     Past Surgical History:   Procedure Laterality Date    TONSILLECTOMY          Medications:       Prior to Admission medications    Medication Sig Start Date End Date Taking?  Authorizing Provider   albuterol sulfate HFA (VENTOLIN HFA) 108 (90 Base) MCG/ACT inhaler Inhale 2 puffs into the lungs 4 times daily as needed for Wheezing 10/28/21  Yes Leila Sher MD   cetirizine (ZYRTEC) 10 MG tablet Take 1 tablet by mouth daily 10/28/21 11/27/21 Yes Leila Sher MD   fluticasone (FLONASE) 50 MCG/ACT nasal spray 2 sprays by Nasal route daily 10/28/21  Yes Leila Sher MD   albuterol (PROVENTIL) (2.5 MG/3ML) 0.083% nebulizer solution Take 3 mLs by nebulization every 4 hours as needed for Wheezing or Shortness of Breath 10/28/21  Yes Db Olivarez MD   busPIRone (BUSPAR) 10 MG tablet Take 1 tablet by mouth 2 times daily 10/14/21 11/13/21 Yes Db Olivarez MD   hydrOXYzine (VISTARIL) 25 MG capsule Take 1 capsule by mouth 3 times daily as needed for Anxiety 10/14/21 10/28/21 Yes Db Olivarez MD   traZODone (DESYREL) 100 MG tablet Take 1 tablet by mouth nightly 10/14/21  Yes Db Olivarez MD        Allergies:       Patient has no known allergies. Social History:     Tobacco: reports that he has never smoked. He has never used smokeless tobacco.  Alcohol:      reports no history of alcohol use. Drug Use:  reports no history of drug use. Family History:     No family history on file. Review of Systems:         Review of Systems   Constitutional: Negative for appetite change, chills, fatigue and unexpected weight change. HENT: Positive for congestion, facial swelling, rhinorrhea and sinus pressure. Negative for ear discharge, ear pain, hoarse voice, nosebleeds, sinus pain, sore throat and trouble swallowing. Eyes: Negative for visual disturbance. Respiratory: Positive for shortness of breath. Negative for cough and wheezing. Cardiovascular: Negative for chest pain, palpitations and leg swelling. Gastrointestinal: Negative for abdominal pain, blood in stool, constipation and nausea. Endocrine: Negative for cold intolerance, heat intolerance, polydipsia and polyuria. Genitourinary: Negative for difficulty urinating and dysuria. Musculoskeletal: Negative. Skin: Negative for rash. Allergic/Immunologic: Negative. Neurological: Negative for dizziness, weakness and headaches. Psychiatric/Behavioral: Negative for behavioral problems, dysphoric mood and sleep disturbance. The patient is not nervous/anxious.           Physical Exam:     Vitals:  /88 (Site: Left Upper Arm, Position: Sitting, Cuff Size: Large Adult)   Pulse 76   Temp 98.6 °F (37 °C) (Temporal)   Resp 18   Ht 6' (1.829 m)   Wt (!) 431 lb 6.4 oz (195.7 kg)   SpO2 98%   BMI 58.51 kg/m²       Physical Exam  Vitals reviewed. Constitutional:       General: He is not in acute distress. Appearance: He is well-developed. He is obese. HENT:      Head: Normocephalic and atraumatic. Right Ear: Tympanic membrane, ear canal and external ear normal.      Left Ear: Tympanic membrane, ear canal and external ear normal.      Nose: Congestion and rhinorrhea present. Mouth/Throat:      Mouth: Mucous membranes are moist.      Pharynx: No oropharyngeal exudate or posterior oropharyngeal erythema. Eyes:      General: No scleral icterus. Right eye: No discharge. Left eye: No discharge. Conjunctiva/sclera: Conjunctivae normal.   Neck:      Thyroid: No thyromegaly. Cardiovascular:      Rate and Rhythm: Normal rate and regular rhythm. Heart sounds: Normal heart sounds. No murmur heard. Pulmonary:      Effort: Pulmonary effort is normal.      Breath sounds: Normal breath sounds. No wheezing or rales. Abdominal:      General: Bowel sounds are normal. There is no distension. Palpations: Abdomen is soft. There is no mass. Tenderness: There is no abdominal tenderness. Musculoskeletal:         General: Normal range of motion. Right lower leg: Edema present. Left lower leg: Edema present. Lymphadenopathy:      Cervical: No cervical adenopathy. Skin:     General: Skin is warm and dry. Coloration: Skin is not pale. Findings: No rash. Neurological:      General: No focal deficit present. Mental Status: He is alert and oriented to person, place, and time. Mental status is at baseline. Psychiatric:         Mood and Affect: Mood normal.         Behavior: Behavior normal.         Thought Content:  Thought content normal.               Data:     Lab Results   Component Value Date     10/26/2021    K 4.4 10/26/2021  10/26/2021    CO2 25 10/26/2021    BUN 10 10/26/2021    CREATININE 0.99 10/26/2021    GLUCOSE 108 10/26/2021    PROT 6.8 2016    LABALBU 4.5 2016    BILITOT 0.57 2016    ALKPHOS 87 2016    AST 19 2016    ALT 21 2016     Lab Results   Component Value Date    WBC 10.0 2021    RBC 4.98 2021    HGB 14.6 2021    HCT 42.7 2021    MCV 85.7 2021    MCH 29.4 2021    MCHC 34.3 2021    RDW 13.2 2021     2021    MPV NOT REPORTED 2021     Lab Results   Component Value Date    TSH 1.53 2016     Lab Results   Component Value Date    CHOL 140 10/26/2021    HDL 27 10/26/2021    LABA1C 4.7 10/15/2015          Assessment & Plan        Diagnosis Orders   1. Anxiety   Symptoms improved, continue same meds    2. Seasonal allergies   Prescribed Flonase, Zyrtec and Albuterol nebs for SOB and wheezing  cetirizine (ZYRTEC) 10 MG tablet    fluticasone (FLONASE) 50 MCG/ACT nasal spray    albuterol (PROVENTIL) (2.5 MG/3ML) 0.083% nebulizer solution                   Completed Refills   Requested Prescriptions     Signed Prescriptions Disp Refills    albuterol sulfate HFA (VENTOLIN HFA) 108 (90 Base) MCG/ACT inhaler 18 g 0     Sig: Inhale 2 puffs into the lungs 4 times daily as needed for Wheezing    cetirizine (ZYRTEC) 10 MG tablet 30 tablet 0     Sig: Take 1 tablet by mouth daily    fluticasone (FLONASE) 50 MCG/ACT nasal spray 16 g 3     Si sprays by Nasal route daily    albuterol (PROVENTIL) (2.5 MG/3ML) 0.083% nebulizer solution 120 each 3     Sig: Take 3 mLs by nebulization every 4 hours as needed for Wheezing or Shortness of Breath     No follow-ups on file.      Orders Placed This Encounter   Medications    albuterol sulfate HFA (VENTOLIN HFA) 108 (90 Base) MCG/ACT inhaler     Sig: Inhale 2 puffs into the lungs 4 times daily as needed for Wheezing     Dispense:  18 g     Refill:  0    cetirizine (ZYRTEC) 10 MG tablet     Sig: Take 1 tablet by mouth daily     Dispense:  30 tablet     Refill:  0    fluticasone (FLONASE) 50 MCG/ACT nasal spray     Si sprays by Nasal route daily     Dispense:  16 g     Refill:  3    albuterol (PROVENTIL) (2.5 MG/3ML) 0.083% nebulizer solution     Sig: Take 3 mLs by nebulization every 4 hours as needed for Wheezing or Shortness of Breath     Dispense:  120 each     Refill:  3     No orders of the defined types were placed in this encounter. There are no Patient Instructions on file for this visit.     Electronically signed by Mia Perea MD on 10/28/2021 at 2:56 PM           Completed Refills      Requested Prescriptions     Signed Prescriptions Disp Refills    albuterol sulfate HFA (VENTOLIN HFA) 108 (90 Base) MCG/ACT inhaler 18 g 0     Sig: Inhale 2 puffs into the lungs 4 times daily as needed for Wheezing    cetirizine (ZYRTEC) 10 MG tablet 30 tablet 0     Sig: Take 1 tablet by mouth daily    fluticasone (FLONASE) 50 MCG/ACT nasal spray 16 g 3     Si sprays by Nasal route daily    albuterol (PROVENTIL) (2.5 MG/3ML) 0.083% nebulizer solution 120 each 3     Sig: Take 3 mLs by nebulization every 4 hours as needed for Wheezing or Shortness of Breath

## 2021-11-04 ENCOUNTER — OFFICE VISIT (OUTPATIENT)
Dept: PRIMARY CARE CLINIC | Age: 31
End: 2021-11-04
Payer: COMMERCIAL

## 2021-11-04 VITALS
HEIGHT: 72 IN | SYSTOLIC BLOOD PRESSURE: 145 MMHG | WEIGHT: 315 LBS | DIASTOLIC BLOOD PRESSURE: 88 MMHG | BODY MASS INDEX: 42.66 KG/M2 | OXYGEN SATURATION: 99 % | HEART RATE: 133 BPM | TEMPERATURE: 100 F

## 2021-11-04 DIAGNOSIS — J02.9 SORE THROAT: ICD-10-CM

## 2021-11-04 DIAGNOSIS — R09.81 NASAL SINUS CONGESTION: ICD-10-CM

## 2021-11-04 DIAGNOSIS — J01.40 ACUTE PANSINUSITIS, RECURRENCE NOT SPECIFIED: Primary | ICD-10-CM

## 2021-11-04 DIAGNOSIS — H66.002 ACUTE SUPPURATIVE OTITIS MEDIA OF LEFT EAR WITHOUT SPONTANEOUS RUPTURE OF TYMPANIC MEMBRANE, RECURRENCE NOT SPECIFIED: ICD-10-CM

## 2021-11-04 LAB — S PYO AG THROAT QL: NORMAL

## 2021-11-04 PROCEDURE — 99213 OFFICE O/P EST LOW 20 MIN: CPT | Performed by: NURSE PRACTITIONER

## 2021-11-04 PROCEDURE — 1036F TOBACCO NON-USER: CPT | Performed by: NURSE PRACTITIONER

## 2021-11-04 PROCEDURE — G8417 CALC BMI ABV UP PARAM F/U: HCPCS | Performed by: NURSE PRACTITIONER

## 2021-11-04 PROCEDURE — G8484 FLU IMMUNIZE NO ADMIN: HCPCS | Performed by: NURSE PRACTITIONER

## 2021-11-04 PROCEDURE — G8427 DOCREV CUR MEDS BY ELIG CLIN: HCPCS | Performed by: NURSE PRACTITIONER

## 2021-11-04 PROCEDURE — 87880 STREP A ASSAY W/OPTIC: CPT | Performed by: NURSE PRACTITIONER

## 2021-11-04 RX ORDER — DOXYCYCLINE HYCLATE 100 MG
100 TABLET ORAL 2 TIMES DAILY
Qty: 20 TABLET | Refills: 0 | Status: SHIPPED | OUTPATIENT
Start: 2021-11-04 | End: 2021-11-14

## 2021-11-04 NOTE — PROGRESS NOTES
Chief Complaint:   Congestion (congestion, nasal drainage, sore throat, facial pain/pressure, x2 days)      History of Present Illness   Source of history provided by:  patient. Jude Christianson is a 28 y.o. old male with a past medical history of:   Past Medical History:   Diagnosis Date    Arthritis     Presents to the walk in clinic for evaluation of sinus pressure, nasal congestion, discolored nasal drainage, bilateral ear pressure, sore throat and intermittent productive cough that initially began October 21, 2021. He seen his PCP on October 28 but reports that he had began to feel better at that time. Over the last 2 days, symptoms have rapidly returned and worsened now with subjective fever/chills. According to Declan Whitaker whole face, it's like got smacked with a truck, my face is just bad, it hurts. I've been spitting up big clumps of green mucous. Its from the sinus drainage. Once I'm up its not as bad\". He has tried Mucinex DM with some relief. Denies  wheezing, CP, SOB, or GI symptoms. Past medical history is significant for COVID-19 infection, diagnosed on September 12,2021. According to Alireza Cottrell, he believes that he had been symptom free prior to this illness. He denies any concern for re exposure. Alireza Cottrell reports a recent Covid-19 test with negative results. Last antibiotic Augmentin prescribed 10/14/2021; Alireza Cottrell reports completed full course.       ROS   Unless otherwise stated in this report or unable to obtain because of the patient's clinical or mental status as evidenced by the medical record, this patients's positive and negative responses for Review of Systems, constitutional, psych, eyes, ENT, cardiovascular, respiratory, gastrointestinal, neurological, genitourinary, musculoskeletal, integument systems and systems related to the presenting problem are either stated in the preceding or were not pertinent or were negative for the symptoms and/or complaints related to the medical problem. Past Surgical History:  has a past surgical history that includes Tonsillectomy. Social History:  reports that he has never smoked. He has never used smokeless tobacco. He reports that he does not drink alcohol and does not use drugs. Family History: family history is not on file. Allergies: Patient has no known allergies. Physical Exam       VS:  BP (!) 145/88 (Site: Right Lower Arm, Position: Sitting, Cuff Size: Medium Adult)   Pulse 133   Temp 100 °F (37.8 °C)   Ht 6' (1.829 m)   Wt (!) 441 lb (200 kg)   SpO2 99%   BMI 59.81 kg/m²        Constitutional:  Alert, development consistent with age. Head:  TTP over the bilateral frontal and maxillary sinuses. Ears:   Bilateral pinna normal. Left TM erythematous, dull with loss of landmarks. Right TM injected. Canals normal bilaterally without swelling or exudate  Nose:  Moderate congestion of the nasal mucosa. There is injection to middle turbinates bilaterally. Throat: Posterior pharyngeal erythema with thick pale yellow post nasal exudate present. Neck:  Supple. There is no palpable anterior cervical adenopathy. Lungs: CTAB without wheezes, rales, or rhonchi. Heart:  Rate at 100 bpm on auscultation with regular rhythm, normal heart sounds, without pathological murmurs, ectopy, gallops, or rubs. Skin:  Normal turgor. Warm, dry, without visible rash. Neurological:  Alert and oriented. Motor functions intact. Responds to verbal commands. Lab / Imaging Results   (All laboratory and radiology results have been personally reviewed by myself)  Labs:  No results found for this visit on 11/04/21. Assessment / Plan   Impression(s):  Na Mayer was seen today for congestion. Diagnoses and all orders for this visit:    Acute pansinusitis, recurrence not specified  -     doxycycline hyclate (VIBRA-TABS) 100 MG tablet;  Take 1 tablet by mouth 2 times daily for 10 days    Acute suppurative otitis media of left ear without spontaneous rupture of tympanic membrane, recurrence not specified  -     doxycycline hyclate (VIBRA-TABS) 100 MG tablet; Take 1 tablet by mouth 2 times daily for 10 days    Sore throat  -     POCT rapid strep A    Nasal sinus congestion      Dago Pinto is a 28 y.o. male presents to clinic with concern for sinus infection. Dago Pinto appears nontoxic, well hydrated and well appearing. Tachycardic, likely due to fever, denies chest pain. Due to length of symptoms with re sickening phenomena,  treating today with Doxycycline, administration and side effects reviewed. Discussed and encouraged to take with food and a probiotic and examples given. Symptomatically treating symptoms with  Mucinex, Ibuprofen and Tylenol as labeled for fever/body aches. Strict return precautions to ED for any worsening or any concerns. АННА Herrera - CNP    This visit was provided as a focused evaluation during the [place] -19 pandemic/national emergency. A comprehensive review of all previous patient history and testing was not conducted. Pertinent findings were elicited during the visit.

## 2021-11-04 NOTE — PATIENT INSTRUCTIONS
Patient Education        Sinusitis: Care Instructions  Your Care Instructions     Sinusitis is an infection of the lining of the sinus cavities in your head. Sinusitis often follows a cold. It causes pain and pressure in your head and face. In most cases, sinusitis gets better on its own in 1 to 2 weeks. But some mild symptoms may last for several weeks. Sometimes antibiotics are needed. Follow-up care is a key part of your treatment and safety. Be sure to make and go to all appointments, and call your doctor if you are having problems. It's also a good idea to know your test results and keep a list of the medicines you take. How can you care for yourself at home? · Take an over-the-counter pain medicine, such as acetaminophen (Tylenol), ibuprofen (Advil, Motrin), or naproxen (Aleve). Read and follow all instructions on the label. · If the doctor prescribed antibiotics, take them as directed. Do not stop taking them just because you feel better. You need to take the full course of antibiotics. · Be careful when taking over-the-counter cold or flu medicines and Tylenol at the same time. Many of these medicines have acetaminophen, which is Tylenol. Read the labels to make sure that you are not taking more than the recommended dose. Too much acetaminophen (Tylenol) can be harmful. · Breathe warm, moist air from a steamy shower, a hot bath, or a sink filled with hot water. Avoid cold, dry air. Using a humidifier in your home may help. Follow the directions for cleaning the machine. · Use saline (saltwater) nasal washes. This can help keep your nasal passages open and wash out mucus and bacteria. You can buy saline nose drops at a grocery store or drugstore. Or you can make your own at home by adding 1 teaspoon of salt and 1 teaspoon of baking soda to 2 cups of distilled water. If you make your own, fill a bulb syringe with the solution, insert the tip into your nostril, and squeeze gently. Nancy Harp your nose.   · Put a hot, wet towel or a warm gel pack on your face 3 or 4 times a day for 5 to 10 minutes each time. · Try a decongestant nasal spray like oxymetazoline (Afrin). Do not use it for more than 3 days in a row. Using it for more than 3 days can make your congestion worse. When should you call for help? Call your doctor now or seek immediate medical care if:    · You have new or worse swelling or redness in your face or around your eyes.     · You have a new or higher fever. Watch closely for changes in your health, and be sure to contact your doctor if:    · You have new or worse facial pain.     · The mucus from your nose becomes thicker (like pus) or has new blood in it.     · You are not getting better as expected. Where can you learn more? Go to https://[email or web address].[email or web address].tv. org and sign in to your [facility] account. Enter J352 in the Year Up box to learn more about \"Sinusitis: Care Instructions. \"     If you do not have an account, please click on the \"Sign Up Now\" link. Current as of: December 2, 2020               Content Version: 13.0  © 1019-7370 [employer]. Care instructions adapted under license by [facility] ([facility]). If you have questions about a medical condition or this instruction, always ask your healthcare professional. [address] any warranty or liability for your use of this information. Patient Education        doxycycline (oral/injection)  Pronunciation:  DOX gui fuentes  Brand:  Acticlate, Adoxa, Alodox, Avidoxy, Doryx, Mondoxyne NL, Monodox, Morgidox, Okebo, Oracea, Oraxyl, Targadox, Vibramycin  What is the most important information I should know about doxycycline? You should not take this medicine if you are allergic to any tetracycline antibiotic. Children younger than 6years old should use doxycycline only in cases of severe or life-threatening conditions.  This medicine can cause permanent yellowing or graying of the teeth in children  Using doxycycline during pregnancy could harm the unborn baby or cause permanent tooth discoloration later in the baby's life. What is doxycycline? Doxycycline is a tetracycline antibiotic that  Doxycycline is used to treat many different bacterial infections, such as acne, urinary tract infections, intestinal infections, eye infections, gonorrhea, chlamydia, periodontitis (gum disease), and others. Doxycycline is also used to treat blemishes, bumps, and acne-like lesions caused by rosacea. Doxycycline will not treat facial redness caused by rosacea. Some forms of doxycycline are used to prevent malaria, to treat anthrax, or to treat infections caused by mites, ticks, or lice. Doxycycline may also be used for purposes not listed in this medication guide. What should I discuss with my healthcare provider before taking doxycycline? You should not take this medicine if you are allergic to doxycycline or other tetracycline antibiotics such as demeclocycline, minocycline, tetracycline, or tigecycline. Tell your doctor if you have ever had:  · liver disease;  · kidney disease;  · asthma or sulfite allergy;  · increased pressure inside your skull; or  · if you also take isotretinoin, seizure medicine, or a blood thinner such as warfarin (Coumadin). If you are using doxycycline to treat gonorrhea, your doctor may test you to make sure you do not also have syphilis, another sexually transmitted disease. Taking this medicine during pregnancy may affect tooth and bone development in the unborn baby. Taking doxycycline during the last half of pregnancy can cause permanent tooth discoloration later in the baby's life. Tell your doctor if you are pregnant or if you become pregnant. Doxycycline can make birth control pills less effective. Ask your doctor about using a non-hormonal birth control (condom, diaphragm with spermicide) to prevent pregnancy.   Doxycycline can pass into breast milk and may affect bone and tooth development in a nursing infant. Do not breastfeed while you are taking doxycycline. Doxycycline can cause permanent yellowing or graying of the teeth in children younger than 6years old. Children should use doxycycline only in cases of severe or life-threatening conditions such as anthrax or [facility] spotted fever. The benefit of treating a serious condition may outweigh any risks to the child's tooth development. How should I take doxycycline? Follow all directions on your prescription label and read all medication guides or instruction sheets. Use the medicine exactly as directed. Take doxycycline with a full glass of water. Drink plenty of liquids while you are taking doxycycline. Read and carefully follow any Instructions for Use provided with your medicine. Ask your doctor or pharmacist if you do not understand these instructions. Most brands of doxycyline may be taken with food or milk if the medicine upsets your stomach. Different brands of doxycycline may have different instructions about taking them with or without food. Take Oracea on an empty stomach, at least 1 hour before or 2 hours after a meal.  You may need to split a doxycycline tablet to get the correct dose. Follow your doctor's instructions. Swallow a delayed-release capsule or tablet whole. Do not crush, chew, break, or open it. Measure liquid medicine  with the dosing syringe provided, or with a special dose-measuring spoon or medicine cup. If you do not have a dose-measuring device, ask your pharmacist for one. If you take doxycycline to prevent malaria: Start taking the medicine 1 or 2 days before entering an area where malaria is common. Continue taking the medicine every day during your stay and for at least 4 weeks after you leave the area. Doxycycline is usually given by injection only if you are unable to take the medicine by mouth.  A healthcare provider will give you this injection as an infusion into a vein. Use this medicine for the full prescribed length of time, even if your symptoms quickly improve. Skipping doses can increase your risk of infection that is resistant to medication. Doxycycline will not treat a viral infection such as the flu or a common cold. Store at room temperature away from moisture, heat, and light. Throw away any unused medicine after the expiration date on the label has passed. Using  doxycycline can cause damage to your kidneys. What happens if I miss a dose? Take the medicine as soon as you can, but skip the missed dose if it is almost time for your next dose. Do not take two doses at one time. What happens if I overdose? Seek emergency medical attention or call the Poison Help line at 1-276.421.6893. What should I avoid while taking doxycycline? Do not take iron supplements, multivitamins, calcium supplements, antacids, or laxatives within 2 hours before or after taking doxycycline. Avoid taking any other antibiotics with doxycycline unless your doctor has told you to. Doxycycline could make you sunburn more easily. Avoid sunlight or tanning beds. Wear protective clothing and use sunscreen (SPF 30 or higher) when you are outdoors. Antibiotic medicines can cause diarrhea, which may be a sign of a new infection. If you have diarrhea that is watery or bloody, call your doctor. Do not use anti-diarrhea medicine unless your doctor tells you to. What are the possible side effects of doxycycline? Get emergency medical help if you have signs of an allergic reaction (hives, difficult breathing, swelling in your face or throat) or a severe skin reaction (fever, sore throat, burning in your eyes, skin pain, red or purple skin rash that spreads and causes blistering and peeling). Seek medical treatment if you have a serious drug reaction that can affect many parts of your body.  Symptoms may include: skin rash, fever, swollen glands, flu-like symptoms, muscle aches, severe weakness, unusual bruising, or yellowing of your skin or eyes. This reaction may occur several weeks after you began using doxycycline. Call your doctor at once if you have:  · severe stomach pain, diarrhea that is watery or bloody;  · throat irritation, trouble swallowing;  · chest pain, irregular heart rhythm, feeling short of breath;  · little or no urination;  · low white blood cell counts --fever, chills, swollen glands, body aches, weakness, pale skin, easy bruising or bleeding;  · increased pressure inside the skull --severe headaches, ringing in your ears, dizziness, nausea, vision problems, pain behind your eyes; or  · signs of liver or pancreas problems --loss of appetite, upper stomach pain (that may spread to your back), tiredness, nausea or vomiting, fast heart rate, dark urine, jaundice (yellowing of the skin or eyes). Common side effects may include:  · nausea, vomiting, upset stomach, loss of appetite;  · mild diarrhea;  · skin rash or itching;  · darkened skin color; or  · vaginal itching or discharge. This is not a complete list of side effects and others may occur. Call your doctor for medical advice about side effects. You may report side effects to FDA at 3-280-FDA-0086. What other drugs will affect doxycycline? Sometimes it is not safe to use certain medications at the same time. Some drugs can affect your blood levels of other drugs you take, which may increase side effects or make the medications less effective. Other drugs may affect doxycycline, including prescription and over-the-counter medicines, vitamins, and herbal products. Tell your doctor about all your current medicines and any medicine you start or stop using. Where can I get more information? Your pharmacist can provide more information about doxycycline.   Remember, keep this and all other medicines out of the reach of children, never share your medicines with others, and use this medication only for the indication prescribed. Every effort has been made to ensure that the information provided by [address]ha [address] is accurate, up-to-date, and complete, but no guarantee is made to that effect. Drug information contained herein may be time sensitive. [facility] information has been compiled for use by healthcare practitioners and consumers in the [place] and therefore [facility] does not warrant that uses outside of the [place] are appropriate, unless specifically indicated otherwise. [facility]'s drug information does not endorse drugs, diagnose patients or recommend therapy. [facility]'s drug information is an informational resource designed to assist licensed healthcare practitioners in caring for their patients and/or to serve consumers viewing this service as a supplement to, and not a substitute for, the expertise, skill, knowledge and judgment of healthcare practitioners. The absence of a warning for a given drug or drug combination in no way should be construed to indicate that the drug or drug combination is safe, effective or appropriate for any given patient. [facility] does not assume any responsibility for any aspect of healthcare administered with the aid of information [facility] provides. The information contained herein is not intended to cover all possible uses, directions, precautions, warnings, drug interactions, allergic reactions, or adverse effects. If you have questions about the drugs you are taking, check with your doctor, nurse or pharmacist.  Copyright 6168-7599 [address]: 21.04. Revision date: 11/4/2020. Care instructions adapted under license by [facility] ([facility]). If you have questions about a medical condition or this instruction, always ask your healthcare professional. [place] [address] any warranty or liability for your use of this information.

## 2021-12-09 ENCOUNTER — HOSPITAL ENCOUNTER (OUTPATIENT)
Dept: SLEEP CENTER | Age: 31
Discharge: HOME | End: 2021-12-09
Payer: COMMERCIAL

## 2021-12-09 DIAGNOSIS — G47.33 OSA (OBSTRUCTIVE SLEEP APNEA): ICD-10-CM

## 2021-12-09 PROCEDURE — 95810 POLYSOM 6/> YRS 4/> PARAM: CPT

## 2021-12-09 ASSESSMENT — SLEEP AND FATIGUE QUESTIONNAIRES
HOW LIKELY ARE YOU TO NOD OFF OR FALL ASLEEP WHILE SITTING AND READING: 0
HOW LIKELY ARE YOU TO NOD OFF OR FALL ASLEEP WHILE LYING DOWN TO REST IN THE AFTERNOON WHEN CIRCUMSTANCES PERMIT: 2
HOW LIKELY ARE YOU TO NOD OFF OR FALL ASLEEP WHILE SITTING INACTIVE IN A PUBLIC PLACE: 0
ESS TOTAL SCORE: 9
HOW LIKELY ARE YOU TO NOD OFF OR FALL ASLEEP WHEN YOU ARE A PASSENGER IN A CAR FOR AN HOUR WITHOUT A BREAK: 3
HOW LIKELY ARE YOU TO NOD OFF OR FALL ASLEEP WHILE SITTING QUIETLY AFTER LUNCH WITHOUT ALCOHOL: 3
HOW LIKELY ARE YOU TO NOD OFF OR FALL ASLEEP WHILE SITTING AND TALKING TO SOMEONE: 0
HOW LIKELY ARE YOU TO NOD OFF OR FALL ASLEEP IN A CAR, WHILE STOPPED FOR A FEW MINUTES IN TRAFFIC: 0
HOW LIKELY ARE YOU TO NOD OFF OR FALL ASLEEP WHILE WATCHING TV: 1
NECK CIRCUMFERENCE (INCHES): 19

## 2021-12-10 NOTE — PROGRESS NOTES
Alan Chew arrived on time for his Diagnostic Sleep Study. Setup and polysomnogram progressed successfully.

## 2021-12-14 LAB — STATUS: NORMAL

## 2022-01-13 ENCOUNTER — HOSPITAL ENCOUNTER (OUTPATIENT)
Dept: SLEEP CENTER | Age: 32
Discharge: HOME | End: 2022-01-13
Payer: COMMERCIAL

## 2022-01-13 DIAGNOSIS — G47.30 SLEEP APNEA, UNSPECIFIED TYPE: ICD-10-CM

## 2022-01-13 PROCEDURE — 95811 POLYSOM 6/>YRS CPAP 4/> PARM: CPT

## 2022-01-17 LAB — STATUS: NORMAL

## 2022-01-28 ENCOUNTER — OFFICE VISIT (OUTPATIENT)
Dept: FAMILY MEDICINE CLINIC | Age: 32
End: 2022-01-28
Payer: COMMERCIAL

## 2022-01-28 VITALS
TEMPERATURE: 98.2 F | SYSTOLIC BLOOD PRESSURE: 136 MMHG | WEIGHT: 315 LBS | BODY MASS INDEX: 42.66 KG/M2 | OXYGEN SATURATION: 98 % | HEIGHT: 72 IN | RESPIRATION RATE: 18 BRPM | DIASTOLIC BLOOD PRESSURE: 76 MMHG

## 2022-01-28 DIAGNOSIS — G47.00 INSOMNIA, UNSPECIFIED TYPE: ICD-10-CM

## 2022-01-28 DIAGNOSIS — J30.2 SEASONAL ALLERGIES: ICD-10-CM

## 2022-01-28 DIAGNOSIS — F41.9 ANXIETY: ICD-10-CM

## 2022-01-28 DIAGNOSIS — B96.89 ACUTE BACTERIAL SINUSITIS: Primary | ICD-10-CM

## 2022-01-28 DIAGNOSIS — J01.90 ACUTE BACTERIAL SINUSITIS: Primary | ICD-10-CM

## 2022-01-28 PROCEDURE — G8417 CALC BMI ABV UP PARAM F/U: HCPCS | Performed by: INTERNAL MEDICINE

## 2022-01-28 PROCEDURE — G8484 FLU IMMUNIZE NO ADMIN: HCPCS | Performed by: INTERNAL MEDICINE

## 2022-01-28 PROCEDURE — 1036F TOBACCO NON-USER: CPT | Performed by: INTERNAL MEDICINE

## 2022-01-28 PROCEDURE — 99214 OFFICE O/P EST MOD 30 MIN: CPT | Performed by: INTERNAL MEDICINE

## 2022-01-28 PROCEDURE — G8427 DOCREV CUR MEDS BY ELIG CLIN: HCPCS | Performed by: INTERNAL MEDICINE

## 2022-01-28 RX ORDER — ALBUTEROL SULFATE 90 UG/1
2 AEROSOL, METERED RESPIRATORY (INHALATION) 4 TIMES DAILY PRN
Qty: 18 G | Refills: 0 | Status: SHIPPED | OUTPATIENT
Start: 2022-01-28

## 2022-01-28 RX ORDER — TRAZODONE HYDROCHLORIDE 100 MG/1
100 TABLET ORAL NIGHTLY
Qty: 30 TABLET | Refills: 3 | Status: SHIPPED | OUTPATIENT
Start: 2022-01-28

## 2022-01-28 RX ORDER — CEPHALEXIN 500 MG/1
500 CAPSULE ORAL 3 TIMES DAILY
Qty: 21 CAPSULE | Refills: 0 | Status: SHIPPED | OUTPATIENT
Start: 2022-01-28 | End: 2022-02-04

## 2022-01-28 RX ORDER — FLUTICASONE PROPIONATE 50 MCG
2 SPRAY, SUSPENSION (ML) NASAL DAILY
Qty: 16 G | Refills: 3 | Status: SHIPPED | OUTPATIENT
Start: 2022-01-28 | End: 2022-08-29

## 2022-01-28 ASSESSMENT — ENCOUNTER SYMPTOMS: COUGH: 1

## 2022-01-28 NOTE — PROGRESS NOTES
HPI Notes    Name: Ash Speaker  : 1990         Chief Complaint:     Chief Complaint   Patient presents with    3 Month Follow-Up     Patient states that he completed the sleep study, states that his anxiety is well managed at this time.  Sinusitis     c/o sinus symptoms and ears feeling plugged up. Sinus drainage has a light green color. Patient is tested 2x per week for Covid at his job. History of Present Illness:        Michael Parra presents to office to follow up for MURTAZA, anxiety, Insomnia and sinus problem    States completed sleep study in [facility]. Was diagnosed  with MURTAZA and awaiting his CPAP delivery    States was exposed to [employer] 2 weeks ago. He was tested negative twice last week. Michael Parra presents as a follow up on anxiety. Current medication for anxiety includes Buspar and prn Vistaril .symptoms are well controlled. Reports no side effects . Not in counseling. Has a h/o Insomnia for many years. Recently got worse. Has been taking Trazodone prn and it helps. He thinks MURTAZA is contributing to his Insomnia. Sleeps about 6-7 hrs per night. Needs refill          Sinusitis  This is a new problem. The current episode started in the past 7 days. The problem is unchanged. There has been no fever. He is experiencing no pain. Associated symptoms include congestion and coughing. Pertinent negatives include no chills, ear pain, headaches, shortness of breath or sore throat. Past treatments include nothing.            Past Medical History:     Past Medical History:   Diagnosis Date    Arthritis       Reviewed all health maintenance requirements and orderedappropriate tests  Health Maintenance Due   Topic Date Due    Flu vaccine (1) 2021    COVID-19 Vaccine (3 - Booster for Pfizer series) 10/31/2021       Past Surgical History:     Past Surgical History:   Procedure Laterality Date    TONSILLECTOMY          Medications:       Prior to Admission medications    Medication Sig Start Date End Date Taking? Authorizing Provider   traZODone (DESYREL) 100 MG tablet Take 1 tablet by mouth nightly 1/28/22  Yes Juan M Doshi MD   fluticasone (FLONASE) 50 MCG/ACT nasal spray 2 sprays by Nasal route daily 1/28/22  Yes Juan M Doshi MD   albuterol sulfate HFA (VENTOLIN HFA) 108 (90 Base) MCG/ACT inhaler Inhale 2 puffs into the lungs 4 times daily as needed for Wheezing 1/28/22  Yes Juan M Doshi MD   cephALEXin (KEFLEX) 500 MG capsule Take 1 capsule by mouth 3 times daily for 7 days 1/28/22 2/4/22 Yes Juan M Doshi MD   albuterol (PROVENTIL) (2.5 MG/3ML) 0.083% nebulizer solution Take 3 mLs by nebulization every 4 hours as needed for Wheezing or Shortness of Breath 10/28/21  Yes Juan M Doshi MD        Allergies:       Patient has no known allergies. Social History:     Tobacco: reports that he has never smoked. He has never used smokeless tobacco.  Alcohol:      reports no history of alcohol use. Drug Use:  reports no history of drug use. Family History:     No family history on file. Review of Systems:         Review of Systems   Constitutional: Negative for activity change, appetite change, chills, fatigue and unexpected weight change. HENT: Positive for congestion. Negative for ear discharge, ear pain and sore throat. Eyes: Negative for visual disturbance. Respiratory: Positive for cough. Negative for shortness of breath and wheezing. Cardiovascular: Negative for chest pain, palpitations and leg swelling. Gastrointestinal: Negative for abdominal pain, blood in stool, constipation and nausea. Endocrine: Negative for cold intolerance, heat intolerance, polydipsia and polyuria. Genitourinary: Negative for difficulty urinating and dysuria. Musculoskeletal: Negative. Skin: Negative for rash. Allergic/Immunologic: Negative. Neurological: Negative for weakness and headaches.    Psychiatric/Behavioral: Negative for behavioral problems and dysphoric mood. The patient is not nervous/anxious. Physical Exam:     Vitals:  /76 (Site: Left Upper Arm, Position: Sitting)   Temp 98.2 °F (36.8 °C) (Temporal)   Resp 18   Ht 6' (1.829 m)   Wt (!) 421 lb 9.6 oz (191.2 kg)   SpO2 98%   BMI 57.18 kg/m²       Physical Exam  Vitals reviewed. Constitutional:       General: He is not in acute distress. Appearance: Normal appearance. He is well-developed. He is not ill-appearing. HENT:      Head: Normocephalic and atraumatic. Right Ear: Tympanic membrane, ear canal and external ear normal.      Left Ear: Tympanic membrane, ear canal and external ear normal.      Nose: Congestion present. Mouth/Throat:      Mouth: Mucous membranes are moist.      Pharynx: Oropharynx is clear. Eyes:      General:         Right eye: No discharge. Left eye: No discharge. Conjunctiva/sclera: Conjunctivae normal.   Neck:      Thyroid: No thyromegaly. Cardiovascular:      Rate and Rhythm: Normal rate and regular rhythm. Heart sounds: Normal heart sounds. No murmur heard. Pulmonary:      Effort: Pulmonary effort is normal.      Breath sounds: Normal breath sounds. No wheezing, rhonchi or rales. Abdominal:      General: Bowel sounds are normal. There is no distension. Palpations: Abdomen is soft. There is no mass. Tenderness: There is no abdominal tenderness. Musculoskeletal:         General: Normal range of motion. Lymphadenopathy:      Cervical: No cervical adenopathy. Skin:     General: Skin is warm and dry. Coloration: Skin is not pale. Findings: No rash. Neurological:      General: No focal deficit present. Mental Status: He is alert and oriented to person, place, and time.    Psychiatric:         Mood and Affect: Mood normal.         Behavior: Behavior normal.               Data:     Lab Results   Component Value Date     10/26/2021    K 4.4 10/26/2021     10/26/2021    CO2 25 10/26/2021 BUN 10 10/26/2021    CREATININE 0.99 10/26/2021    GLUCOSE 108 10/26/2021    PROT 6.8 2016    LABALBU 4.5 2016    BILITOT 0.57 2016    ALKPHOS 87 2016    AST 19 2016    ALT 21 2016     Lab Results   Component Value Date    WBC 10.0 2021    RBC 4.98 2021    HGB 14.6 2021    HCT 42.7 2021    MCV 85.7 2021    MCH 29.4 2021    MCHC 34.3 2021    RDW 13.2 2021     2021    MPV NOT REPORTED 2021     Lab Results   Component Value Date    TSH 1.53 2016     Lab Results   Component Value Date    CHOL 140 10/26/2021    HDL 27 10/26/2021    LABA1C 4.7 10/15/2015          Assessment & Plan        Diagnosis Orders   1. Acute bacterial sinusitis   Prescribed Keflex, advised prn OTC nasal saline spray cephALEXin (KEFLEX) 500 MG capsule   2. Insomnia, unspecified type   Symptoms controlled, continue on Trazodone  traZODone (DESYREL) 100 MG tablet   3. Seasonal allergies   Refill Flonase fluticasone (FLONASE) 50 MCG/ACT nasal spray    albuterol sulfate HFA (VENTOLIN HFA) 108 (90 Base) MCG/ACT inhaler   4. Anxiety   Symptoms controlled, continue on Buspar and Prn Vistaril                     Completed Refills   Requested Prescriptions     Signed Prescriptions Disp Refills    traZODone (DESYREL) 100 MG tablet 30 tablet 3     Sig: Take 1 tablet by mouth nightly    fluticasone (FLONASE) 50 MCG/ACT nasal spray 16 g 3     Si sprays by Nasal route daily    albuterol sulfate HFA (VENTOLIN HFA) 108 (90 Base) MCG/ACT inhaler 18 g 0     Sig: Inhale 2 puffs into the lungs 4 times daily as needed for Wheezing    cephALEXin (KEFLEX) 500 MG capsule 21 capsule 0     Sig: Take 1 capsule by mouth 3 times daily for 7 days     Return in about 6 months (around 2022) for insomnia, anxiety.      Orders Placed This Encounter   Medications    traZODone (DESYREL) 100 MG tablet     Sig: Take 1 tablet by mouth nightly     Dispense:  30 tablet Refill:  3    fluticasone (FLONASE) 50 MCG/ACT nasal spray     Si sprays by Nasal route daily     Dispense:  16 g     Refill:  3    albuterol sulfate HFA (VENTOLIN HFA) 108 (90 Base) MCG/ACT inhaler     Sig: Inhale 2 puffs into the lungs 4 times daily as needed for Wheezing     Dispense:  18 g     Refill:  0    cephALEXin (KEFLEX) 500 MG capsule     Sig: Take 1 capsule by mouth 3 times daily for 7 days     Dispense:  21 capsule     Refill:  0     No orders of the defined types were placed in this encounter. There are no Patient Instructions on file for this visit.     Electronically signed by Juan M Doshi MD on 2/3/2022 at 10:50 AM           Completed Refills      Requested Prescriptions     Signed Prescriptions Disp Refills    traZODone (DESYREL) 100 MG tablet 30 tablet 3     Sig: Take 1 tablet by mouth nightly    fluticasone (FLONASE) 50 MCG/ACT nasal spray 16 g 3     Si sprays by Nasal route daily    albuterol sulfate HFA (VENTOLIN HFA) 108 (90 Base) MCG/ACT inhaler 18 g 0     Sig: Inhale 2 puffs into the lungs 4 times daily as needed for Wheezing    cephALEXin (KEFLEX) 500 MG capsule 21 capsule 0     Sig: Take 1 capsule by mouth 3 times daily for 7 days

## 2022-01-31 ASSESSMENT — ENCOUNTER SYMPTOMS
WHEEZING: 0
ABDOMINAL PAIN: 0
SHORTNESS OF BREATH: 0
BLOOD IN STOOL: 0
ALLERGIC/IMMUNOLOGIC NEGATIVE: 1
NAUSEA: 0
SORE THROAT: 0
CONSTIPATION: 0

## 2022-02-03 PROBLEM — F41.9 ANXIETY: Status: ACTIVE | Noted: 2022-02-03

## 2022-06-16 ENCOUNTER — OFFICE VISIT (OUTPATIENT)
Dept: PRIMARY CARE CLINIC | Age: 32
End: 2022-06-16
Payer: COMMERCIAL

## 2022-06-16 VITALS
DIASTOLIC BLOOD PRESSURE: 103 MMHG | BODY MASS INDEX: 55.06 KG/M2 | TEMPERATURE: 98.1 F | HEART RATE: 70 BPM | OXYGEN SATURATION: 99 % | RESPIRATION RATE: 16 BRPM | WEIGHT: 315 LBS | SYSTOLIC BLOOD PRESSURE: 150 MMHG

## 2022-06-16 DIAGNOSIS — J06.9 VIRAL UPPER RESPIRATORY TRACT INFECTION: ICD-10-CM

## 2022-06-16 DIAGNOSIS — H66.002 NON-RECURRENT ACUTE SUPPURATIVE OTITIS MEDIA OF LEFT EAR WITHOUT SPONTANEOUS RUPTURE OF TYMPANIC MEMBRANE: Primary | ICD-10-CM

## 2022-06-16 PROCEDURE — 99213 OFFICE O/P EST LOW 20 MIN: CPT | Performed by: NURSE PRACTITIONER

## 2022-06-16 PROCEDURE — G8417 CALC BMI ABV UP PARAM F/U: HCPCS | Performed by: NURSE PRACTITIONER

## 2022-06-16 PROCEDURE — G8427 DOCREV CUR MEDS BY ELIG CLIN: HCPCS | Performed by: NURSE PRACTITIONER

## 2022-06-16 PROCEDURE — 1036F TOBACCO NON-USER: CPT | Performed by: NURSE PRACTITIONER

## 2022-06-16 RX ORDER — AMOXICILLIN 400 MG/5ML
875 POWDER, FOR SUSPENSION ORAL 2 TIMES DAILY
Qty: 218 ML | Refills: 0 | Status: SHIPPED | OUTPATIENT
Start: 2022-06-16 | End: 2022-06-26

## 2022-06-16 ASSESSMENT — ENCOUNTER SYMPTOMS
RHINORRHEA: 1
SORE THROAT: 0
SINUS PAIN: 1
VOMITING: 0
NAUSEA: 0
SHORTNESS OF BREATH: 0
COUGH: 1
DIARRHEA: 0
WHEEZING: 0

## 2022-06-16 NOTE — PROGRESS NOTES
nebulization every 4 hours as needed for Wheezing or Shortness of Breath 120 each 3    fluticasone (FLONASE) 50 MCG/ACT nasal spray 2 sprays by Nasal route daily (Patient not taking: Reported on 6/16/2022) 16 g 3     No current facility-administered medications for this visit. No Known Allergies    :     Review of Systems   Constitutional: Negative for appetite change, chills, diaphoresis, fatigue and fever. HENT: Positive for congestion, ear pain (Left ear pain with fullness. throbbing pain 5/10 was worse last night 10/10. No ear drainage.), postnasal drip, rhinorrhea and sinus pain. Negative for ear discharge and sore throat. Respiratory: Positive for cough. Negative for shortness of breath and wheezing. Gastrointestinal: Negative for diarrhea, nausea and vomiting. Skin: Negative for rash and wound. Neurological: Positive for headaches. Negative for dizziness and light-headedness.       :     Physical Exam  Vitals and nursing note reviewed. Constitutional:       General: He is not in acute distress. Appearance: Normal appearance. He is well-developed. He is not ill-appearing or diaphoretic. Comments: Well hydrated, nontoxic appearance. HENT:      Head: Normocephalic and atraumatic. Right Ear: Hearing, ear canal and external ear normal. No drainage. A middle ear effusion (Pale white fluid.) is present. No mastoid tenderness. Tympanic membrane is not injected, erythematous or bulging. Left Ear: Hearing, ear canal and external ear normal. No drainage. A middle ear effusion (Pale yellow fluid.) is present. No mastoid tenderness. Tympanic membrane is injected, erythematous and bulging. Nose: Mucosal edema and congestion present. No rhinorrhea. Right Sinus: Maxillary sinus tenderness present. No frontal sinus tenderness. Left Sinus: Maxillary sinus tenderness present. No frontal sinus tenderness. Mouth/Throat:      Lips: Pink.       Mouth: Mucous membranes are moist.      Pharynx: Oropharynx is clear. Uvula midline. No pharyngeal swelling, oropharyngeal exudate, posterior oropharyngeal erythema or uvula swelling. Eyes:      Conjunctiva/sclera: Conjunctivae normal.      Pupils: Pupils are equal, round, and reactive to light. Cardiovascular:      Rate and Rhythm: Normal rate and regular rhythm. Heart sounds: Normal heart sounds, S1 normal and S2 normal. No murmur heard. No friction rub. No gallop. Pulmonary:      Effort: Pulmonary effort is normal. No accessory muscle usage or respiratory distress. Breath sounds: Normal breath sounds and air entry. No decreased breath sounds, wheezing, rhonchi or rales. Comments: Occasional moist cough. Breath sounds clear B/L anterior and posterior lobes. Chest expansion symmetrical.  No audible wheezing or respiratory distress. No rales or rhonchi. Abdominal:      General: Bowel sounds are normal.      Palpations: Abdomen is soft. Tenderness: There is no abdominal tenderness. Musculoskeletal:      Cervical back: Neck supple. Lymphadenopathy:      Cervical: Cervical adenopathy present. Right cervical: Superficial cervical adenopathy present. No posterior cervical adenopathy. Left cervical: Superficial cervical adenopathy present. No posterior cervical adenopathy. Skin:     General: Skin is warm and dry. Coloration: Skin is not pale. Findings: No erythema or rash. Neurological:      Mental Status: He is alert and oriented to person, place, and time. Psychiatric:         Behavior: Behavior normal. Behavior is cooperative. BP (!) 150/103 (Site: Right Upper Arm)   Pulse 70   Temp 98.1 °F (36.7 °C)   Resp 16   Wt (!) 406 lb (184.2 kg)   SpO2 99%   BMI 55.06 kg/m²     :      Diagnosis Orders   1. Non-recurrent acute suppurative otitis media of left ear without spontaneous rupture of tympanic membrane  amoxicillin (AMOXIL) 400 MG/5ML suspension   2.  Viral upper respiratory tract infection  Pseudoephedrine-DM-GG 60- MG TABS       :      Return if symptoms worsen or fail to improve, for Resume all previous medications as directed. Orders Placed This Encounter   Medications    amoxicillin (AMOXIL) 400 MG/5ML suspension     Sig: Take 10.9 mLs by mouth 2 times daily for 10 days     Dispense:  218 mL     Refill:  0    Pseudoephedrine-DM-GG 60- MG TABS     Sig: Take 1 tablet by mouth 4 times daily as needed (For cough, congestion and/or sinus pain/pressure)     Dispense:  28 tablet     Refill:  0      1. Otitis media:  · Practice meticulous handwashing and cover cough to prevent spread of infection  · Encouraged to increase fluids and rest   · Continue antibiotic as prescribed until all doses completed. Requests liquid antibiotic. · Tylenol/Ibuprofen OTC PRN for pain, discomfort or fever as directed on package according to age/weight. · Warm compresses to ear to relieve discomfort  · Elevate head of bed or use pillow. · Patient instructions given for otitis media and amoxicillin. · To ER or call 911 if any difficulty breathing, shortness of breath, inability to swallow, hives, rash, facial/tongue swelling or temp greater than 103 degrees. · Follow up with PCP as needed if symptoms worsen or do not improve. 2.  Upper respiratory infection:  · Practice meticulous handwashing and cover cough to prevent spread of infection  · Encouraged to increase fluids and rest  · Tylenol/Ibuprofen OTC PRN for pain, discomfort or fever as directed on package  · OTC cough medications for cough and congestion as directed on package  · Warm salt water gargles for sore throat  · Cool mist humidifier  · Hot tea with honey and lemon for cough PRN  · Patient instructions given for upper respiratory infection. · To ER or call 911 if any difficulty breathing, shortness of breath, inability to swallow, hives, rash, facial/tongue swelling or temp greater than 103 degrees.   · Follow up with PCP or Walk in Care as needed if symptoms worsen or do not improve. Haily Miller received counseling on the following healthy behaviors: increased fluids and rest.  Patient given educational materials - see patient instructions. Discussed use, benefit, and side effects of prescribed medications. Treatment plan discussed at visit. Continue routine health care follow up. All patient questions answered. Pt voiced understanding.       Electronically signed by АННА Best CNP on 6/16/2022 at 10:52 AM

## 2022-06-16 NOTE — LETTER
[facility] [address]  Phone: [phone]  Fax: Peggy Heredia, APRN - CNP        June 16, 2022     Patient: Truong Mchugh   YOB: 1990   Date of Visit: 6/16/2022       To Whom it May Concern:    Terrie De Anda was seen in my clinic on 6/16/2022. He may return to work on 06/17/2022. Please excuse for 06/16/2022. If you have any questions or concerns, please don't hesitate to call.     Sincerely,         Angie Ascencio, APRN - CNP

## 2022-06-16 NOTE — PATIENT INSTRUCTIONS
Patient Education        Ear Infection (Otitis Media): Care Instructions  Overview     An ear infection may start with a cold and affect the middle ear (otitis media). It can hurt a lot. Most ear infections clear up on their own in a couple of days and do not need antibiotics. Also, antibiotics do not work against viruses, which may be the cause of your infection. Regular doses ofpain relievers are the best way to reduce your fever and help you feel better. Follow-up care is a key part of your treatment and safety. Be sure to make and go to all appointments, and call your doctor if you are having problems. It's also a good idea to know your test results and keep alist of the medicines you take. How can you care for yourself at home?  Take pain medicines exactly as directed. ? If the doctor gave you a prescription medicine for pain, take it as prescribed. ? If you are not taking a prescription pain medicine, take an over-the-counter medicine, such as acetaminophen (Tylenol), ibuprofen (Advil, Motrin), or naproxen (Aleve). Read and follow all instructions on the label. ? Do not take two or more pain medicines at the same time unless the doctor told you to. Many pain medicines have acetaminophen, which is Tylenol. Too much acetaminophen (Tylenol) can be harmful.  Plan to take a full dose of pain reliever before bedtime. Getting enough sleep will help you get better.  Try a warm, moist washcloth on the ear. It may help relieve pain.  If your doctor prescribed antibiotics, take them as directed. Do not stop taking them just because you feel better. You need to take the full course of antibiotics. When should you call for help? Call your doctor now or seek immediate medical care if:     You have new or increasing ear pain.      You have new or increasing pus or blood draining from your ear.      You have a fever with a stiff neck or a severe headache.    Watch closely for changes in your health, and be (also called \"mono\");   diarrhea caused by taking antibiotics; or   food or drug allergies (especially to a cephalosporin antibiotic such as Omnicef, Cefzil, Ceftin, Keflex, and others). It is not known whether this medicine will harm an unborn baby. Tell yourdoctor if you are pregnant or plan to become pregnant. Amoxicillin can make birth control pills less effective. Ask your doctor about using a non-hormonal birth control (condom, diaphragm, cervical cap, orcontraceptive sponge) to prevent pregnancy. It may not be safe to breastfeed while using this medicine. Ask your doctorabout any risk. How should I take amoxicillin? Follow all directions on your prescription label and read all medication guidesor instruction sheets. Use the medicine exactly as directed. Take this medicine at the same time each day. Some forms of amoxicillin may be taken with or without food. Check your medicine label to see if you should take your amoxicillin with food or not. Shake the oral suspension (liquid) before you measure a dose. Measure liquid medicine with the dosing syringe provided, or use a medicine dose-measuring device (not a kitchen spoon). You may mix the liquid with water, milk, baby formula, fruit juice, or ginger ale. Drink all of the mixture right away. Do not save forlater use. You must chew the chewable tablet before you swallow it. Swallow the regular tablet whole and do not crush, chew, or break it. You will need frequent medical tests. If you are taking amoxicillin with clarithromycin and/or lansoprazole to treat stomach ulcer, use all of your medications as directed. Read the medication guide or patient instructions provided with each medication. Do not change yourdoses or medication schedule without your doctor's advice. Use this medicine for the full prescribed length of time, even if your symptoms quickly improve. Skipping doses can increase your risk of infection that is resistant to medication. Amoxicillin will not treat a viral infection such asthe flu or a common cold. Do not share this medicine with another person, even if they have the same symptoms you have. This medicine can affect the results of certain medical tests. Tell any doctorwho treats you that you are using amoxicillin. Store at room temperature away from moisture, heat, and light. You may store liquid amoxicillin in a refrigerator but do not allow it to freeze. Throw away any liquid amoxicillin that is not used within 14 days afterit was mixed at the pharmacy. What happens if I miss a dose? Skip the missed dose and use your next dose at the regular time. Do not use two doses at one time. What happens if I overdose? Seek emergency medical attention or call the Poison Help line at 1-857.389.1123. What should I avoid while taking amoxicillin? Antibiotic medicines can cause diarrhea, which may be a sign of a new infection. If you have diarrhea that is watery or bloody, call your doctor before using anti-diarrhea medicine. What are the possible side effects of amoxicillin? Get emergency medical help if you have signs of an allergic reaction (hives, difficult breathing, swelling in your face or throat) or a severe skin reaction (fever, sore throat, burning eyes, skin pain, red or purple skin rash withblistering and peeling). Call your doctor at once if you have:   severe stomach pain; or   diarrhea that is watery or bloody (even if it occurs months after your last dose). Common side effects may include:   nausea, vomiting, diarrhea; or   rash. This is not a complete list of side effects and others may occur. Call your doctor for medical advice about side effects. You may report side effects toFDA at 6-276-MHQ-0692. What other drugs will affect amoxicillin?   Tell your doctor about all your other medicines, especially:   any other antibiotics;   allopurinol;   probenecid; or   a blood thinner --warfarin, Coumadin, Kennedy Chamorro. This list is not complete. Other drugs may affect amoxicillin, including prescription and over-the-counter medicines, vitamins, and herbal products. Notall possible drug interactions are listed here. Where can I get more information? Your pharmacist can provide more information about amoxicillin. Remember, keep this and all other medicines out of the reach of children, never share your medicines with others, and use this medication only for the indication prescribed. Every effort has been made to ensure that the information provided by [address]ha [address] is accurate, up-to-date, and complete, but no guarantee is made to that effect. Drug information contained herein may be time sensitive. [facility] information has been compiled for use by healthcare practitioners and consumers in the [place] and therefore [facility] does not warrant that uses outside of the [place] are appropriate, unless specifically indicated otherwise. [facility]'s drug information does not endorse drugs, diagnose patients or recommend therapy. [facility] drug information is an informational resource designed to assist licensed healthcare practitioners in caring for their patients and/or to serve consumers viewing this service as a supplement to, and not a substitute for, the expertise, skill, knowledge and judgment of healthcare practitioners. The absence of a warning for a given drug or drug combination in no way should be construed to indicate that the drug or drug combination is safe, effective or appropriate for any given patient. [facility] does not assume any responsibility for any aspect of healthcare administered with the aid of information [facility] provides. The information contained herein is not intended to cover all possible uses, directions, precautions, warnings, drug interactions, allergic reactions, or adverse effects.  If you have questions about the drugs you are taking, check with yourdoctor, nurse or pharmacist.  Copyright 7302-7233 [employer]. Version: 10.01. Revision date:11/26/2019. Care instructions adapted under license by [facility] ([facility]). If you have questions about a medical condition or this instruction, always ask your healthcare professional. [address] any warranty or liability for your use of this information. Patient Education        Upper Respiratory Infection (Cold): Care Instructions  Your Care Instructions     An upper respiratory infection, or URI, is an infection of the nose, sinuses, or throat. URIs are spread by coughs, sneezes, and direct contact. The common cold is the most frequent kind of URI. The flu and sinus infections are otherkinds of URIs. Almost all URIs are caused by viruses. Antibiotics won't cure them. But you can treat most infections with home care. This may include drinking lots of fluids and taking over-the-counter pain medicine. You will probably feel better in 4to 10 days. The doctor has checked you carefully, but problems can develop later. If you notice any problems or new symptoms, get medical treatment right away. Follow-up care is a key part of your treatment and safety. Be sure to make and go to all appointments, and call your doctor if you are having problems. It's also a good idea to know your test results and keep alist of the medicines you take. How can you care for yourself at home?  To prevent dehydration, drink plenty of fluids. Choose water and other clear liquids until you feel better. If you have kidney, heart, or liver disease and have to limit fluids, talk with your doctor before you increase the amount of fluids you drink.  Take an over-the-counter pain medicine, such as acetaminophen (Tylenol), ibuprofen (Advil, Motrin), or naproxen (Aleve). Read and follow all instructions on the label.  Before you use cough and cold medicines, check the label.  These medicines may not be safe for young children or for people with certain health problems.  Be careful when taking over-the-counter cold or flu medicines and Tylenol at the same time. Many of these medicines have acetaminophen, which is Tylenol. Read the labels to make sure that you are not taking more than the recommended dose. Too much acetaminophen (Tylenol) can be harmful.  Get plenty of rest.   Do not smoke or allow others to smoke around you. If you need help quitting, talk to your doctor about stop-smoking programs and medicines. These can increase your chances of quitting for good. When should you call for help? Call 911 anytime you think you may need emergency care. For example, call if:     You have severe trouble breathing. Call your doctor now or seek immediate medical care if:     You seem to be getting much sicker.      You have new or worse trouble breathing.      You have a new or higher fever.      You have a new rash. Watch closely for changes in your health, and be sure to contact your doctor if:     You have a new symptom, such as a sore throat, an earache, or sinus pain.      You cough more deeply or more often, especially if you notice more mucus or a change in the color of your mucus.      You do not get better as expected. Where can you learn more? Go to [email or web address]://Medigram.Envoy. org and sign in to your [facility] account. Enter T413 in the Innovectra box to learn more about \"Upper Respiratory Infection (Cold): Care Instructions. \"     If you do not have an account, please click on the \"Sign Up Now\" link. Current as of: July 6, 2021               Content Version: 13.2  © 2006-2022 Healthwise, Incorporated. Care instructions adapted under license by [facility] ([facility]). If you have questions about a medical condition or this instruction, always ask your healthcare professional. [place] [address] any warranty or liability for your use of this information.        1.  Otitis media:  · Practice meticulous handwashing and cover cough to prevent spread of infection  · Encouraged to increase fluids and rest   · Continue antibiotic as prescribed until all doses completed. · Tylenol/Ibuprofen OTC PRN for pain, discomfort or fever as directed on package according to age/weight. · Warm compresses to ear to relieve discomfort  · Elevate head of bed or use pillow. · Patient instructions given for otitis media and amoxicillin. · To ER or call 911 if any difficulty breathing, shortness of breath, inability to swallow, hives, rash, facial/tongue swelling or temp greater than 103 degrees. · Follow up with PCP as needed if symptoms worsen or do not improve. 2.  Upper respiratory infection:  · Practice meticulous handwashing and cover cough to prevent spread of infection  · Encouraged to increase fluids and rest  · Tylenol/Ibuprofen OTC PRN for pain, discomfort or fever as directed on package  · OTC cough medications for cough and congestion as directed on package  · Warm salt water gargles for sore throat  · Cool mist humidifier  · Hot tea with honey and lemon for cough PRN  · Patient instructions given for upper respiratory infection. · To ER or call 911 if any difficulty breathing, shortness of breath, inability to swallow, hives, rash, facial/tongue swelling or temp greater than 103 degrees. · Follow up with PCP or [facility] as needed if symptoms worsen or do not improve.

## 2022-07-28 PROBLEM — G47.00 INSOMNIA: Status: ACTIVE | Noted: 2022-07-28

## 2022-08-29 ENCOUNTER — HOSPITAL ENCOUNTER (EMERGENCY)
Age: 32
Discharge: HOME | End: 2022-08-29
Attending: EMERGENCY MEDICINE
Payer: COMMERCIAL

## 2022-08-29 ENCOUNTER — HOSPITAL ENCOUNTER (OUTPATIENT)
Dept: PREADMISSION TESTING | Age: 32
Setting detail: SPECIMEN
Discharge: HOME | End: 2022-08-29
Payer: COMMERCIAL

## 2022-08-29 ENCOUNTER — TELEPHONE (OUTPATIENT)
Dept: PRIMARY CARE CLINIC | Age: 32
End: 2022-08-29

## 2022-08-29 ENCOUNTER — OFFICE VISIT (OUTPATIENT)
Dept: PRIMARY CARE CLINIC | Age: 32
End: 2022-08-29
Payer: COMMERCIAL

## 2022-08-29 VITALS
TEMPERATURE: 98.5 F | HEART RATE: 95 BPM | BODY MASS INDEX: 42.66 KG/M2 | OXYGEN SATURATION: 97 % | SYSTOLIC BLOOD PRESSURE: 160 MMHG | DIASTOLIC BLOOD PRESSURE: 91 MMHG | RESPIRATION RATE: 18 BRPM | WEIGHT: 315 LBS | HEIGHT: 72 IN

## 2022-08-29 VITALS
DIASTOLIC BLOOD PRESSURE: 86 MMHG | OXYGEN SATURATION: 97 % | TEMPERATURE: 98.9 F | HEIGHT: 72 IN | RESPIRATION RATE: 18 BRPM | HEART RATE: 89 BPM | BODY MASS INDEX: 42.66 KG/M2 | WEIGHT: 315 LBS | SYSTOLIC BLOOD PRESSURE: 129 MMHG

## 2022-08-29 DIAGNOSIS — J30.2 SEASONAL ALLERGIES: ICD-10-CM

## 2022-08-29 DIAGNOSIS — J06.9 VIRAL UPPER RESPIRATORY TRACT INFECTION: Primary | ICD-10-CM

## 2022-08-29 DIAGNOSIS — R05.9 COUGH: ICD-10-CM

## 2022-08-29 DIAGNOSIS — J01.01 ACUTE RECURRENT MAXILLARY SINUSITIS: Primary | ICD-10-CM

## 2022-08-29 DIAGNOSIS — R09.89 RUNNY NOSE: ICD-10-CM

## 2022-08-29 DIAGNOSIS — R09.81 SINUS CONGESTION: ICD-10-CM

## 2022-08-29 DIAGNOSIS — Z76.0 MEDICATION REFILL: ICD-10-CM

## 2022-08-29 PROBLEM — R03.0 ELEVATED BLOOD-PRESSURE READING, WITHOUT DIAGNOSIS OF HYPERTENSION: Status: ACTIVE | Noted: 2022-01-19

## 2022-08-29 PROBLEM — R07.9 CHEST PAIN, UNSPECIFIED: Status: ACTIVE | Noted: 2022-01-19

## 2022-08-29 PROBLEM — Z20.822 CONTACT WITH AND (SUSPECTED) EXPOSURE TO COVID-19: Status: ACTIVE | Noted: 2022-01-19

## 2022-08-29 PROBLEM — U07.1 COVID-19: Status: ACTIVE | Noted: 2021-09-30

## 2022-08-29 PROBLEM — R06.00 DYSPNEA, UNSPECIFIED: Status: ACTIVE | Noted: 2021-09-30

## 2022-08-29 LAB
SARS-COV-2, RAPID: NOT DETECTED
SPECIMEN DESCRIPTION: NORMAL

## 2022-08-29 PROCEDURE — G8417 CALC BMI ABV UP PARAM F/U: HCPCS | Performed by: NURSE PRACTITIONER

## 2022-08-29 PROCEDURE — G8427 DOCREV CUR MEDS BY ELIG CLIN: HCPCS | Performed by: NURSE PRACTITIONER

## 2022-08-29 PROCEDURE — 99213 OFFICE O/P EST LOW 20 MIN: CPT | Performed by: NURSE PRACTITIONER

## 2022-08-29 PROCEDURE — 99283 EMERGENCY DEPT VISIT LOW MDM: CPT

## 2022-08-29 PROCEDURE — 1036F TOBACCO NON-USER: CPT | Performed by: NURSE PRACTITIONER

## 2022-08-29 PROCEDURE — C9803 HOPD COVID-19 SPEC COLLECT: HCPCS

## 2022-08-29 PROCEDURE — 87635 SARS-COV-2 COVID-19 AMP PRB: CPT

## 2022-08-29 RX ORDER — ECHINACEA PURPUREA EXTRACT 125 MG
1 TABLET ORAL PRN
Qty: 1 EACH | Refills: 0 | Status: SHIPPED | OUTPATIENT
Start: 2022-08-29

## 2022-08-29 RX ORDER — DEXTROMETHORPHAN HYDROBROMIDE, GUAIFENESIN AND PSEUDOEPHEDRINE HYDROCHLORIDE 15; 400; 60 MG/1; MG/1; MG/1
TABLET ORAL
Qty: 28 TABLET | Refills: 0 | Status: SHIPPED | OUTPATIENT
Start: 2022-08-29

## 2022-08-29 RX ORDER — FLUTICASONE PROPIONATE 50 MCG
1 SPRAY, SUSPENSION (ML) NASAL DAILY
Qty: 1 EACH | Refills: 0 | Status: SHIPPED | OUTPATIENT
Start: 2022-08-29

## 2022-08-29 RX ORDER — PREDNISONE 20 MG/1
TABLET ORAL
Qty: 10 TABLET | Refills: 0 | Status: SHIPPED | OUTPATIENT
Start: 2022-08-29

## 2022-08-29 RX ORDER — PREDNISONE 20 MG/1
40 TABLET ORAL DAILY
Qty: 10 TABLET | Refills: 0 | Status: CANCELLED | OUTPATIENT
Start: 2022-08-29 | End: 2022-09-03

## 2022-08-29 ASSESSMENT — PAIN - FUNCTIONAL ASSESSMENT: PAIN_FUNCTIONAL_ASSESSMENT: NONE - DENIES PAIN

## 2022-08-29 ASSESSMENT — LIFESTYLE VARIABLES
HOW MANY STANDARD DRINKS CONTAINING ALCOHOL DO YOU HAVE ON A TYPICAL DAY: PATIENT DOES NOT DRINK
HOW OFTEN DO YOU HAVE A DRINK CONTAINING ALCOHOL: NEVER

## 2022-08-29 NOTE — ED PROVIDER NOTES
eMERGENCY dEPARTMENT eNCOUnter        [address] [facility]    Chief Complaint   Patient presents with    Nasal Congestion     Patient states sinus issues started Saturday afternoon with sob    Shortness of Breath       [facility]    Amor Lindo is a 33 y.o. male who presents to ED from home. By car. With complaint of sinus pressure and congestion. Onset x 3 days. Patient has been having symptoms the past 3 days. Patient has been taking over-the-counter decongestant with no relief of the symptoms. Presents to ED with persistent congestion and sinus pressure. Patient has prior history of recurrent sinusitis. REVIEW OF SYSTEMS    All systems reviewed and positives are in the HPI      PAST MEDICAL HISTORY    Past Medical History:   Diagnosis Date    Arthritis        SURGICAL HISTORY    Past Surgical History:   Procedure Laterality Date    TONSILLECTOMY         CURRENT MEDICATIONS    Current Outpatient Rx   Medication Sig Dispense Refill    predniSONE (DELTASONE) 20 MG tablet 2 Tablets daily x3 days then 1 tablet daily 10 tablet 0    sodium chloride (OCEAN) 0.65 % nasal spray 1 spray by Nasal route as needed for Congestion 1 each 0    fluticasone (FLONASE) 50 MCG/ACT nasal spray 1 spray by Nasal route daily (Patient not taking: Reported on 8/29/2022) 1 each 0    Pseudoephedrine-DM-GG (CAPMIST DM) 60- MG TABS 1 capsule every 6 hours by mouth as needed for cough and congestion. Do not exceed 4 capsules per day.  28 tablet 0    traZODone (DESYREL) 100 MG tablet Take 1 tablet by mouth nightly (Patient not taking: No sig reported) 30 tablet 3    albuterol sulfate HFA (VENTOLIN HFA) 108 (90 Base) MCG/ACT inhaler Inhale 2 puffs into the lungs 4 times daily as needed for Wheezing 18 g 0    albuterol (PROVENTIL) (2.5 MG/3ML) 0.083% nebulizer solution Take 3 mLs by nebulization every 4 hours as needed for Wheezing or Shortness of Breath 120 each 3       ALLERGIES    No Known Allergies    FAMILY HISTORY    History reviewed. No pertinent family history. SOCIAL HISTORY    Social History     Socioeconomic History    Marital status: Single     Spouse name: None    Number of children: None    Years of education: None    Highest education level: None   Tobacco Use    Smoking status: Never    Smokeless tobacco: Never   Vaping Use    Vaping Use: Never used   Substance and Sexual Activity    Alcohol use: No    Drug use: No     Social Determinants of Health     Financial Resource Strain: Low Risk     Difficulty of Paying Living Expenses: Not hard at all   Food Insecurity: No Food Insecurity    Worried About Service Route in the Last Year: Never true    Ran Out of Food in the Last Year: Never true       PHYSICAL EXAM    VITAL SIGNS: BP (!) 160/91   Pulse 95   Temp 98.5 °F (36.9 °C) (Oral)   Resp 18   Ht 6' (1.829 m)   Wt (!) 392 lb 6.4 oz (178 kg)   SpO2 97%   BMI 53.22 kg/m²   Constitutional:  Well developed, well nourished, no acute distress, non-toxic appearance   Eyes: PERRL, conjunctiva normal   HENT: Nasal congestion postnasal drainage the nasal sinus pressure   respiratory: Clear to auscultation bilaterally. Cardiovascular:  Normal rate, normal rhythm, no murmurs, no gallops, no rubs   Musculoskeletal:  No edema   Integument:  Well hydrated, no rash     RADIOLOGY/PROCEDURES    No orders to display         Summation      Patient Course: She will be sent home with prescription for prednisone and saline nasal spray.     ED Medications administered this visit:  Medications - No data to display    New Prescriptions from this visit:    Discharge Medication List as of 8/29/2022  3:04 PM        START taking these medications    Details   predniSONE (DELTASONE) 20 MG tablet 2 Tablets daily x3 days then 1 tablet daily, Disp-10 tablet, R-0Normal      sodium chloride (OCEAN) 0.65 % nasal spray 1 spray by Nasal route as needed for Congestion, Disp-1 each, R-0Normal             Follow-up:  MD [address]  [phone]    In 1 week  As needed, If symptoms worsen        Final Impression:   1.  Acute recurrent maxillary sinusitis               (Please note that portions of this note were completed with a voice recognition program.  Efforts were made to edit the dictations but occasionally words are mis-transcribed.)         Devin Sorenson MD  08/29/22 1953

## 2022-08-29 NOTE — PROGRESS NOTES
Chief Complaint   Congestion (Started Saturday- Cough, nasal congestion, pressure in head, scratchy throat, chest congestion, runny nose. )      History of Present Illness   Source of history provided by: patient. Reyna Ramirez is a 33 y.o. old male who has a past medical history of:   Past Medical History:   Diagnosis Date    Arthritis     Presents to the clinic for evaluation of 2 days of cough, nasal/sinus congestion, scratchy throat, runny nose and chest congestion. According to Jessee Zelaya, over the weekend he was out in his yard doing yard work and began to notice a scratchy throat with runny nose. He reports that he typically gets the symptoms once a year during this time. He has not tried anything over-the-counter for the symptoms. He is requesting a refill of Flonase today. 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 Today, he denies chest pain, shortness of breath, fever, chills or GI symptoms. He reports he does have a cough but feels is due from the sinus drainage. ROS   Pertinent positives and negatives are stated within HPI, all other systems reviewed and are negative. Surgical History:  has a past surgical history that includes Tonsillectomy. Social History:  reports that he has never smoked. He has never used smokeless tobacco. He reports that he does not drink alcohol and does not use drugs. Family History: family history is not on file. Allergies: Patient has no known allergies. Physical Exam    VS:  /86 (Site: Right Upper Arm, Position: Sitting, Cuff Size: Large Adult)   Pulse 89   Temp 98.9 °F (37.2 °C) (Oral)   Resp 18   Ht 6' (1.829 m)   Wt (!) 406 lb (184.2 kg)   SpO2 97%   BMI 55.06 kg/m²      Constitutional:  Alert, development consistent with age. NAD. Afebrile. Head:  NC/NT. Airway patent.    Mouth: Posterior pharynx with mild erythema and clear postnasal drip. No tonsillar hypertrophy or exudate. Neck:  Normal ROM. Supple. No anterior cervical adenopathy noted. Lungs: CTAB without wheezes, rales, or rhonchi. CV:  Regular rate and rhythm, normal heart sounds, without ectopy, gallops, or rubs. Skin:  Normal turgor. Warm, dry, without visible rash. Lymphatic: No lymphangitis or adenopathy noted. Neurological:  Oriented. Motor functions intact. Lab / Imaging Results   (All laboratory and radiology results have been personally reviewed by myself)  Labs:  No results found for this visit on 08/29/22. Imaging: All Radiology results interpreted by Radiologist unless otherwise noted. No results found. Medical Decision Making   Pt non-toxic, in no apparent distress and stable at time of discharge. Assessment/Plan   Nayely Hand was seen today for congestion. Diagnoses and all orders for this visit:    Viral upper respiratory tract infection    Seasonal allergies  -     fluticasone (FLONASE) 50 MCG/ACT nasal spray; 1 spray by Nasal route daily (Patient not taking: Reported on 8/29/2022)    Runny nose  -     Cancel: COVID-19; Future  -     Pseudoephedrine-DM-GG (CAPMIST DM) 60- MG TABS; 1 capsule every 6 hours by mouth as needed for cough and congestion. Do not exceed 4 capsules per day. Cough  -     Cancel: COVID-19; Future  -     Pseudoephedrine-DM-GG (CAPMIST DM) 60- MG TABS; 1 capsule every 6 hours by mouth as needed for cough and congestion. Do not exceed 4 capsules per day. Sinus congestion  -     Pseudoephedrine-DM-GG (CAPMIST DM) 60- MG TABS; 1 capsule every 6 hours by mouth as needed for cough and congestion. Do not exceed 4 capsules per day. Medication refill  -     fluticasone (FLONASE) 50 MCG/ACT nasal spray; 1 spray by Nasal route daily (Patient not taking: Reported on 8/29/2022)    - Cee Trammell appears well, hydrated and with clear lung sounds without distress.   Discussed with Nayely Hand that

## 2022-08-29 NOTE — TELEPHONE ENCOUNTER
Mt Jay called in stating that he needs something different then was was prescribed. He stated that he needs antibiotic. He knows what his body needs and it is not what you have prescribed.

## 2023-04-25 ENCOUNTER — HOSPITAL ENCOUNTER (EMERGENCY)
Age: 33
Discharge: HOME | End: 2023-04-25
Attending: EMERGENCY MEDICINE
Payer: COMMERCIAL

## 2023-04-25 VITALS
RESPIRATION RATE: 18 BRPM | BODY MASS INDEX: 42.66 KG/M2 | OXYGEN SATURATION: 100 % | HEART RATE: 97 BPM | DIASTOLIC BLOOD PRESSURE: 91 MMHG | SYSTOLIC BLOOD PRESSURE: 168 MMHG | HEIGHT: 72 IN | WEIGHT: 315 LBS | TEMPERATURE: 98.1 F

## 2023-04-25 DIAGNOSIS — J06.9 ACUTE UPPER RESPIRATORY INFECTION: Primary | ICD-10-CM

## 2023-04-25 PROCEDURE — 99283 EMERGENCY DEPT VISIT LOW MDM: CPT

## 2023-04-25 RX ORDER — PREDNISONE 20 MG/1
40 TABLET ORAL DAILY
Qty: 10 TABLET | Refills: 0 | Status: SHIPPED | OUTPATIENT
Start: 2023-04-25 | End: 2023-04-30

## 2023-04-25 RX ORDER — FLUTICASONE PROPIONATE 50 MCG
2 SPRAY, SUSPENSION (ML) NASAL DAILY
Qty: 48 G | Refills: 1 | Status: SHIPPED | OUTPATIENT
Start: 2023-04-25

## 2023-04-25 ASSESSMENT — PAIN DESCRIPTION - FREQUENCY: FREQUENCY: INTERMITTENT

## 2023-04-25 ASSESSMENT — PAIN SCALES - GENERAL: PAINLEVEL_OUTOF10: 4

## 2023-04-25 ASSESSMENT — PAIN DESCRIPTION - DESCRIPTORS: DESCRIPTORS: BURNING

## 2023-04-25 ASSESSMENT — PAIN DESCRIPTION - PAIN TYPE: TYPE: ACUTE PAIN

## 2023-04-25 ASSESSMENT — PAIN DESCRIPTION - LOCATION: LOCATION: THROAT

## 2023-04-25 NOTE — ED PROVIDER NOTES
eMERGENCY dEPARTMENT eNCOUnter        [address] [facility]    Chief Complaint   Patient presents with    Pharyngitis     Sore throat, left ear pain and congestion that started yesterday        HPI    Delia Leon is a 33 y.o. male who presents to ED with sore throat. He has been having sore throat and congestion since yesterday. Patient has been having cough and congestion. Patient denies shortness of breath. Has productive cough. REVIEW OF SYSTEMS    All systems reviewed and positives are in the HPI      PAST MEDICAL HISTORY    Past Medical History:   Diagnosis Date    Arthritis        SURGICAL HISTORY    Past Surgical History:   Procedure Laterality Date    TONSILLECTOMY         CURRENT MEDICATIONS    Current Outpatient Rx   Medication Sig Dispense Refill    predniSONE (DELTASONE) 20 MG tablet Take 2 tablets by mouth daily for 5 doses 10 tablet 0    fluticasone (FLONASE) 50 MCG/ACT nasal spray 2 sprays by Each Nostril route daily 48 g 1    sodium chloride (OCEAN) 0.65 % nasal spray 1 spray by Nasal route as needed for Congestion 1 each 0    albuterol sulfate HFA (VENTOLIN HFA) 108 (90 Base) MCG/ACT inhaler Inhale 2 puffs into the lungs 4 times daily as needed for Wheezing 18 g 0    albuterol (PROVENTIL) (2.5 MG/3ML) 0.083% nebulizer solution Take 3 mLs by nebulization every 4 hours as needed for Wheezing or Shortness of Breath 120 each 3       ALLERGIES    No Known Allergies    FAMILY HISTORY    History reviewed. No pertinent family history.     SOCIAL HISTORY    Social History     Socioeconomic History    Marital status: Single     Spouse name: None    Number of children: None    Years of education: None    Highest education level: None   Tobacco Use    Smoking status: Never    Smokeless tobacco: Never   Vaping Use    Vaping Use: Never used   Substance and Sexual Activity    Alcohol use: No    Drug use: No       PHYSICAL EXAM    VITAL SIGNS: BP (!) 168/91   Pulse 97   Temp 98.1 °F (36.7 °C) (Oral)   Resp

## 2023-07-17 ENCOUNTER — HOSPITAL ENCOUNTER (EMERGENCY)
Age: 33
Discharge: HOME | End: 2023-07-17
Attending: EMERGENCY MEDICINE
Payer: COMMERCIAL

## 2023-07-17 VITALS
HEART RATE: 78 BPM | BODY MASS INDEX: 42.66 KG/M2 | HEIGHT: 72 IN | OXYGEN SATURATION: 100 % | SYSTOLIC BLOOD PRESSURE: 153 MMHG | WEIGHT: 315 LBS | TEMPERATURE: 98.1 F | RESPIRATION RATE: 18 BRPM | DIASTOLIC BLOOD PRESSURE: 91 MMHG
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
## 2023-07-17 DIAGNOSIS — L03.116 CELLULITIS OF LEFT LOWER LEG: ICD-10-CM

## 2023-07-17 DIAGNOSIS — S80.862A NONVENOMOUS INSECT BITE OF LEFT LOWER EXTREMITY, INITIAL ENCOUNTER: Primary | ICD-10-CM

## 2023-07-17 PROCEDURE — 99283 EMERGENCY DEPT VISIT LOW MDM: CPT

## 2023-07-17 RX ORDER — CEPHALEXIN 500 MG/1
500 CAPSULE ORAL 4 TIMES DAILY
Qty: 28 CAPSULE | Refills: 0 | Status: SHIPPED | OUTPATIENT
Start: 2023-07-17 | End: 2023-07-24

## 2023-07-17 ASSESSMENT — PAIN - FUNCTIONAL ASSESSMENT: PAIN_FUNCTIONAL_ASSESSMENT: 0-10

## 2023-07-17 ASSESSMENT — LIFESTYLE VARIABLES
HOW OFTEN DO YOU HAVE A DRINK CONTAINING ALCOHOL: NEVER
HOW MANY STANDARD DRINKS CONTAINING ALCOHOL DO YOU HAVE ON A TYPICAL DAY: PATIENT DOES NOT DRINK

## 2023-07-17 ASSESSMENT — PAIN SCALES - GENERAL: PAINLEVEL_OUTOF10: 4

## 2023-07-17 ASSESSMENT — PAIN DESCRIPTION - PAIN TYPE: TYPE: ACUTE PAIN

## 2023-07-17 ASSESSMENT — PAIN DESCRIPTION - ONSET: ONSET: ON-GOING

## 2023-07-17 ASSESSMENT — PAIN DESCRIPTION - DESCRIPTORS: DESCRIPTORS: THROBBING

## 2023-07-17 ASSESSMENT — PAIN DESCRIPTION - FREQUENCY: FREQUENCY: CONTINUOUS

## 2023-07-17 ASSESSMENT — PAIN DESCRIPTION - ORIENTATION: ORIENTATION: LEFT

## 2023-07-17 ASSESSMENT — PAIN DESCRIPTION - LOCATION: LOCATION: LEG

## 2023-07-17 NOTE — ED PROVIDER NOTES
EMERGENCY DEPARTMENT ENCOUNTER      CHIEF COMPLAINT    Chief Complaint   Patient presents with    Insect Bite     Started yesterday on left lower leg       HPI    Donna Isaac is a 33 y.o. male who presents to the emergency room for evaluation of left lower leg tenderness and swelling and pain. He states he woke up with this this morning with the symptoms. No fevers or chills or vomiting. PAST MEDICAL HISTORY    Past Medical History:   Diagnosis Date    Arthritis        SURGICAL HISTORY    Past Surgical History:   Procedure Laterality Date    TONSILLECTOMY         CURRENT MEDICATIONS    Current Outpatient Rx   Medication Sig Dispense Refill    fluticasone (FLONASE) 50 MCG/ACT nasal spray 2 sprays by Each Nostril route daily 48 g 1    sodium chloride (OCEAN) 0.65 % nasal spray 1 spray by Nasal route as needed for Congestion 1 each 0    albuterol sulfate HFA (VENTOLIN HFA) 108 (90 Base) MCG/ACT inhaler Inhale 2 puffs into the lungs 4 times daily as needed for Wheezing 18 g 0    albuterol (PROVENTIL) (2.5 MG/3ML) 0.083% nebulizer solution Take 3 mLs by nebulization every 4 hours as needed for Wheezing or Shortness of Breath 120 each 3       ALLERGIES    No Known Allergies    FAMILY HISTORY    History reviewed. No pertinent family history.     SOCIAL HISTORY    Social History     Socioeconomic History    Marital status: Single     Spouse name: None    Number of children: None    Years of education: None    Highest education level: None   Tobacco Use    Smoking status: Never    Smokeless tobacco: Never   Vaping Use    Vaping Use: Never used   Substance and Sexual Activity    Alcohol use: No    Drug use: No           Review of Systems:  Constitutional:  Denies fever, chills, weight loss or weakness   Eyes:  Denies photophobia or discharge   HENT:  Denies sore throat or ear pain   Respiratory:  Denies cough or shortness of breath   Cardiovascular:  Denies chest pain, palpitations or swelling   GI:  Denies

## 2023-07-17 NOTE — DISCHARGE INSTRUCTIONS
Take antibiotic as prescribed. Call your doctor for follow-up. Return to ER immediately for worsening redness or swelling or pain or other concerns.

## 2023-09-05 ENCOUNTER — HOSPITAL ENCOUNTER (EMERGENCY)
Age: 33
Discharge: HOME | End: 2023-09-05
Attending: EMERGENCY MEDICINE
Payer: COMMERCIAL

## 2023-09-05 VITALS
TEMPERATURE: 98.7 F | RESPIRATION RATE: 17 BRPM | OXYGEN SATURATION: 100 % | SYSTOLIC BLOOD PRESSURE: 152 MMHG | HEIGHT: 72 IN | HEART RATE: 79 BPM | DIASTOLIC BLOOD PRESSURE: 108 MMHG | WEIGHT: 315 LBS | BODY MASS INDEX: 42.66 KG/M2

## 2023-09-05 DIAGNOSIS — J06.9 VIRAL URI: Primary | ICD-10-CM

## 2023-09-05 LAB
SARS-COV-2 RDRP RESP QL NAA+PROBE: NOT DETECTED
SPECIMEN DESCRIPTION: NORMAL

## 2023-09-05 PROCEDURE — 99283 EMERGENCY DEPT VISIT LOW MDM: CPT

## 2023-09-05 PROCEDURE — 87635 SARS-COV-2 COVID-19 AMP PRB: CPT

## 2023-09-05 PROCEDURE — C9803 HOPD COVID-19 SPEC COLLECT: HCPCS

## 2023-09-05 ASSESSMENT — PAIN SCALES - GENERAL: PAINLEVEL_OUTOF10: 4

## 2023-09-05 NOTE — ED PROVIDER NOTES
eMERGENCY dEPARTMENT eNCOUnter        [address] [facility]    Chief Complaint   Patient presents with    Concern For COVID-19     States he feels his chest pressure, fever, symptoms feel like when he had  COVID before. HPI    Elton Dee is a 29 y.o. male who presents to ED not doing well chest pressure fever and congestion since yesterday. Patient is concerned about COVID-19. Patient works in a nursing home. Patient states that he had fever at home. Patient denies nausea denies chest pain denies shortness of breath. Patient states that he feels the same as when he had COVID-19. REVIEW OF SYSTEMS    All systems reviewed and positives are in the HPI      PAST MEDICAL HISTORY    Past Medical History:   Diagnosis Date    Arthritis        SURGICAL HISTORY    Past Surgical History:   Procedure Laterality Date    TONSILLECTOMY         CURRENT MEDICATIONS    Current Outpatient Rx   Medication Sig Dispense Refill    fluticasone (FLONASE) 50 MCG/ACT nasal spray 2 sprays by Each Nostril route daily 48 g 1    sodium chloride (OCEAN) 0.65 % nasal spray 1 spray by Nasal route as needed for Congestion 1 each 0    albuterol sulfate HFA (VENTOLIN HFA) 108 (90 Base) MCG/ACT inhaler Inhale 2 puffs into the lungs 4 times daily as needed for Wheezing 18 g 0    albuterol (PROVENTIL) (2.5 MG/3ML) 0.083% nebulizer solution Take 3 mLs by nebulization every 4 hours as needed for Wheezing or Shortness of Breath 120 each 3       ALLERGIES    No Known Allergies    FAMILY HISTORY    History reviewed. No pertinent family history.     SOCIAL HISTORY    Social History     Socioeconomic History    Marital status: Single     Spouse name: None    Number of children: None    Years of education: None    Highest education level: None   Tobacco Use    Smoking status: Never    Smokeless tobacco: Never   Vaping Use    Vaping Use: Never used   Substance and Sexual Activity    Alcohol use: No    Drug use: No       PHYSICAL EXAM    VITAL SIGNS:

## 2023-12-04 SDOH — HEALTH STABILITY: PHYSICAL HEALTH: ON AVERAGE, HOW MANY DAYS PER WEEK DO YOU ENGAGE IN MODERATE TO STRENUOUS EXERCISE (LIKE A BRISK WALK)?: 2 DAYS

## 2023-12-04 SDOH — HEALTH STABILITY: PHYSICAL HEALTH: ON AVERAGE, HOW MANY MINUTES DO YOU ENGAGE IN EXERCISE AT THIS LEVEL?: 20 MIN

## 2023-12-05 ENCOUNTER — TELEPHONE (OUTPATIENT)
Dept: INTERNAL MEDICINE CLINIC | Age: 33
End: 2023-12-05

## 2023-12-05 ENCOUNTER — OFFICE VISIT (OUTPATIENT)
Dept: INTERNAL MEDICINE CLINIC | Age: 33
End: 2023-12-05
Payer: COMMERCIAL

## 2023-12-05 VITALS
WEIGHT: 315 LBS | BODY MASS INDEX: 42.66 KG/M2 | HEIGHT: 72 IN | HEART RATE: 75 BPM | OXYGEN SATURATION: 98 % | DIASTOLIC BLOOD PRESSURE: 78 MMHG | SYSTOLIC BLOOD PRESSURE: 120 MMHG

## 2023-12-05 DIAGNOSIS — G47.33 OBSTRUCTIVE SLEEP APNEA SYNDROME: ICD-10-CM

## 2023-12-05 DIAGNOSIS — J45.20 MILD INTERMITTENT ASTHMA WITHOUT COMPLICATION: ICD-10-CM

## 2023-12-05 DIAGNOSIS — J30.2 SEASONAL ALLERGIC RHINITIS, UNSPECIFIED TRIGGER: ICD-10-CM

## 2023-12-05 DIAGNOSIS — R12 HEARTBURN: ICD-10-CM

## 2023-12-05 DIAGNOSIS — Z76.89 ENCOUNTER TO ESTABLISH CARE: Primary | Chronic | ICD-10-CM

## 2023-12-05 DIAGNOSIS — F32.A ANXIETY AND DEPRESSION: ICD-10-CM

## 2023-12-05 DIAGNOSIS — E55.9 VITAMIN D DEFICIENCY: ICD-10-CM

## 2023-12-05 DIAGNOSIS — R73.09 ELEVATED GLUCOSE: ICD-10-CM

## 2023-12-05 DIAGNOSIS — R09.A2 GLOBUS SENSATION: ICD-10-CM

## 2023-12-05 DIAGNOSIS — M25.561 CHRONIC PAIN OF RIGHT KNEE: ICD-10-CM

## 2023-12-05 DIAGNOSIS — F41.9 ANXIETY AND DEPRESSION: ICD-10-CM

## 2023-12-05 DIAGNOSIS — Z87.39 HISTORY OF GOUT: ICD-10-CM

## 2023-12-05 DIAGNOSIS — R13.10 DYSPHAGIA, UNSPECIFIED TYPE: ICD-10-CM

## 2023-12-05 DIAGNOSIS — Z11.4 ENCOUNTER FOR SCREENING FOR HIV: ICD-10-CM

## 2023-12-05 DIAGNOSIS — I10 HYPERTENSION, UNSPECIFIED TYPE: ICD-10-CM

## 2023-12-05 DIAGNOSIS — F90.2 ATTENTION DEFICIT HYPERACTIVITY DISORDER (ADHD), COMBINED TYPE: ICD-10-CM

## 2023-12-05 DIAGNOSIS — Z13.220 LIPID SCREENING: ICD-10-CM

## 2023-12-05 DIAGNOSIS — E66.01 MORBID OBESITY WITH BMI OF 50.0-59.9, ADULT (HCC): ICD-10-CM

## 2023-12-05 DIAGNOSIS — G89.29 CHRONIC PAIN OF RIGHT KNEE: ICD-10-CM

## 2023-12-05 PROBLEM — F90.9 ADHD (ATTENTION DEFICIT HYPERACTIVITY DISORDER): Status: ACTIVE | Noted: 2023-12-05

## 2023-12-05 PROBLEM — G47.30 SLEEP APNEA: Status: ACTIVE | Noted: 2023-12-05

## 2023-12-05 PROBLEM — J30.9 ALLERGIC RHINITIS: Status: ACTIVE | Noted: 2023-12-05

## 2023-12-05 PROBLEM — R03.0 ELEVATED BLOOD-PRESSURE READING, WITHOUT DIAGNOSIS OF HYPERTENSION: Status: RESOLVED | Noted: 2022-01-19 | Resolved: 2023-12-05

## 2023-12-05 PROCEDURE — 3078F DIAST BP <80 MM HG: CPT | Performed by: NURSE PRACTITIONER

## 2023-12-05 PROCEDURE — G8484 FLU IMMUNIZE NO ADMIN: HCPCS | Performed by: NURSE PRACTITIONER

## 2023-12-05 PROCEDURE — 3074F SYST BP LT 130 MM HG: CPT | Performed by: NURSE PRACTITIONER

## 2023-12-05 PROCEDURE — 99214 OFFICE O/P EST MOD 30 MIN: CPT | Performed by: NURSE PRACTITIONER

## 2023-12-05 PROCEDURE — G8427 DOCREV CUR MEDS BY ELIG CLIN: HCPCS | Performed by: NURSE PRACTITIONER

## 2023-12-05 PROCEDURE — 1036F TOBACCO NON-USER: CPT | Performed by: NURSE PRACTITIONER

## 2023-12-05 PROCEDURE — G8417 CALC BMI ABV UP PARAM F/U: HCPCS | Performed by: NURSE PRACTITIONER

## 2023-12-05 RX ORDER — ALBUTEROL SULFATE 2.5 MG/3ML
2.5 SOLUTION RESPIRATORY (INHALATION) EVERY 4 HOURS PRN
Qty: 120 EACH | Refills: 3 | Status: SHIPPED | OUTPATIENT
Start: 2023-12-05

## 2023-12-05 RX ORDER — GUANFACINE 1 MG/1
TABLET ORAL
COMMUNITY
Start: 2023-12-01

## 2023-12-05 RX ORDER — HYDROXYZINE 50 MG/1
TABLET, FILM COATED ORAL
COMMUNITY
Start: 2023-11-15

## 2023-12-05 RX ORDER — FLUTICASONE PROPIONATE 50 MCG
2 SPRAY, SUSPENSION (ML) NASAL DAILY
Qty: 48 G | Refills: 1 | Status: SHIPPED | OUTPATIENT
Start: 2023-12-05

## 2023-12-05 RX ORDER — SERTRALINE HCL 50 MG
TABLET ORAL
COMMUNITY
Start: 2023-12-01

## 2023-12-05 RX ORDER — ALBUTEROL SULFATE 90 UG/1
2 AEROSOL, METERED RESPIRATORY (INHALATION) 4 TIMES DAILY PRN
Qty: 18 G | Refills: 0 | Status: SHIPPED | OUTPATIENT
Start: 2023-12-05

## 2023-12-05 SDOH — ECONOMIC STABILITY: FOOD INSECURITY: WITHIN THE PAST 12 MONTHS, THE FOOD YOU BOUGHT JUST DIDN'T LAST AND YOU DIDN'T HAVE MONEY TO GET MORE.: NEVER TRUE

## 2023-12-05 SDOH — ECONOMIC STABILITY: INCOME INSECURITY: HOW HARD IS IT FOR YOU TO PAY FOR THE VERY BASICS LIKE FOOD, HOUSING, MEDICAL CARE, AND HEATING?: NOT HARD AT ALL

## 2023-12-05 SDOH — ECONOMIC STABILITY: FOOD INSECURITY: WITHIN THE PAST 12 MONTHS, YOU WORRIED THAT YOUR FOOD WOULD RUN OUT BEFORE YOU GOT MONEY TO BUY MORE.: NEVER TRUE

## 2023-12-05 SDOH — ECONOMIC STABILITY: HOUSING INSECURITY
IN THE LAST 12 MONTHS, WAS THERE A TIME WHEN YOU DID NOT HAVE A STEADY PLACE TO SLEEP OR SLEPT IN A SHELTER (INCLUDING NOW)?: NO

## 2023-12-05 ASSESSMENT — ENCOUNTER SYMPTOMS
COLOR CHANGE: 0
COUGH: 0
EYE DISCHARGE: 0
CONSTIPATION: 0
WHEEZING: 0
SHORTNESS OF BREATH: 1
TROUBLE SWALLOWING: 1
DIARRHEA: 0
ABDOMINAL PAIN: 1

## 2023-12-05 ASSESSMENT — PATIENT HEALTH QUESTIONNAIRE - PHQ9
2. FEELING DOWN, DEPRESSED OR HOPELESS: 1
1. LITTLE INTEREST OR PLEASURE IN DOING THINGS: 1
SUM OF ALL RESPONSES TO PHQ9 QUESTIONS 1 & 2: 2
SUM OF ALL RESPONSES TO PHQ QUESTIONS 1-9: 2

## 2023-12-05 NOTE — PROGRESS NOTES
asthma without complication        Controlled on albuterol prn, continue current treatment plan    albuterol (PROVENTIL) (2.5 MG/3ML) 0.083% nebulizer solution [250]      albuterol sulfate HFA (VENTOLIN HFA) 108 (90 Base) MCG/ACT inhaler [32230]      DME Order for Nebulizer as OP [EFE336 Custom]           Seasonal allergic rhinitis, unspecified trigger        Controlled on Flonase, consider adding Singulair with concurrent asthma    fluticasone (FLONASE) 50 MCG/ACT nasal spray [18108]           Chronic pain of right knee        Advised on weight loss, will check Xray and refer for PT, may use Tylenol prn    XR KNEE RIGHT (3 VIEWS) [47985 CPT(R)]   - Future Order    Uric Acid [62439 Custom]   - Future Order    [facility] [TBZ13 Custom]           Globus sensation        Needs egd, will refer to Ermias Wright MD, Gastroenterology, [place] Custom]           Attention deficit hyperactivity disorder (ADHD), combined type        Controlled on guanfacine, management per psych (Zepf)    guanFACINE (TENEX) 1 MG tablet [26798]           Encounter for screening for HIV        HIV Screen [88820 Custom]   - Future Order         Vitamin D deficiency        Will check level and treat if indicated    Vitamin D 25 Hydroxy [25737 Custom]   - Future Order         Lipid screening        Lipid Panel [92590 Custom]   - Future Order         Elevated glucose        Hemoglobin A1C [02213 Custom]   - Future Order         History of gout        Uric Acid [31354 Custom]   - Future Order         Dysphagia, unspecified type        Augusto Teixeira MD, Gastroenterology, [place] [QZC35 Custom]           Anxiety and depression        Stable on zoloft, hydroxyzine, management per psych    hydrOXYzine HCl (ATARAX) 50 MG tablet [0614]      ZOLOFT 50 MG tablet [5446]           Heartburn        Adriane Nelson MD, Gastroenterology, [place] [BSH62 Custom]           Morbid obesity with BMI of 50.0-59.9,

## 2023-12-05 NOTE — TELEPHONE ENCOUNTER
Faxed CPAP and nebulizer, office notes, and demographics to [facility] [phone].

## 2023-12-27 DIAGNOSIS — J45.20 MILD INTERMITTENT ASTHMA WITHOUT COMPLICATION: ICD-10-CM

## 2023-12-28 RX ORDER — ALBUTEROL SULFATE 90 UG/1
AEROSOL, METERED RESPIRATORY (INHALATION)
Qty: 18 G | Refills: 0 | Status: SHIPPED | OUTPATIENT
Start: 2023-12-28

## 2024-01-08 ENCOUNTER — APPOINTMENT (OUTPATIENT)
Dept: PHYSICAL THERAPY | Age: 34
End: 2024-01-08
Payer: COMMERCIAL

## 2024-01-09 DIAGNOSIS — E55.9 VITAMIN D DEFICIENCY: Primary | ICD-10-CM

## 2024-01-09 RX ORDER — ERGOCALCIFEROL 1.25 MG/1
50000 CAPSULE ORAL WEEKLY
Qty: 12 CAPSULE | Refills: 0 | Status: SHIPPED | OUTPATIENT
Start: 2024-01-09

## 2024-01-10 ENCOUNTER — HOSPITAL ENCOUNTER (OUTPATIENT)
Dept: PHYSICAL THERAPY | Age: 34
Setting detail: THERAPIES SERIES
Discharge: HOME | End: 2024-01-10

## 2024-01-10 ENCOUNTER — OFFICE VISIT (OUTPATIENT)
Dept: FAMILY MEDICINE CLINIC | Age: 34
End: 2024-01-10
Payer: COMMERCIAL

## 2024-01-10 ENCOUNTER — APPOINTMENT (OUTPATIENT)
Dept: PHYSICAL THERAPY | Age: 34
End: 2024-01-10
Payer: COMMERCIAL

## 2024-01-10 VITALS
TEMPERATURE: 98.7 F | SYSTOLIC BLOOD PRESSURE: 133 MMHG | DIASTOLIC BLOOD PRESSURE: 74 MMHG | HEART RATE: 78 BPM | OXYGEN SATURATION: 98 %

## 2024-01-10 DIAGNOSIS — H60.502 ACUTE OTITIS EXTERNA OF LEFT EAR, UNSPECIFIED TYPE: ICD-10-CM

## 2024-01-10 DIAGNOSIS — H66.002 NON-RECURRENT ACUTE SUPPURATIVE OTITIS MEDIA OF LEFT EAR WITHOUT SPONTANEOUS RUPTURE OF TYMPANIC MEMBRANE: Primary | ICD-10-CM

## 2024-01-10 PROCEDURE — G8484 FLU IMMUNIZE NO ADMIN: HCPCS | Performed by: NURSE PRACTITIONER

## 2024-01-10 PROCEDURE — G8417 CALC BMI ABV UP PARAM F/U: HCPCS | Performed by: NURSE PRACTITIONER

## 2024-01-10 PROCEDURE — 3075F SYST BP GE 130 - 139MM HG: CPT | Performed by: NURSE PRACTITIONER

## 2024-01-10 PROCEDURE — 1036F TOBACCO NON-USER: CPT | Performed by: NURSE PRACTITIONER

## 2024-01-10 PROCEDURE — 4130F TOPICAL PREP RX AOE: CPT | Performed by: NURSE PRACTITIONER

## 2024-01-10 PROCEDURE — 99213 OFFICE O/P EST LOW 20 MIN: CPT | Performed by: NURSE PRACTITIONER

## 2024-01-10 PROCEDURE — G8427 DOCREV CUR MEDS BY ELIG CLIN: HCPCS | Performed by: NURSE PRACTITIONER

## 2024-01-10 PROCEDURE — 3078F DIAST BP <80 MM HG: CPT | Performed by: NURSE PRACTITIONER

## 2024-01-10 RX ORDER — BUSPIRONE HYDROCHLORIDE 10 MG/1
TABLET ORAL
COMMUNITY
Start: 2024-01-10

## 2024-01-10 RX ORDER — AMOXICILLIN 250 MG/5ML
500 POWDER, FOR SUSPENSION ORAL 3 TIMES DAILY
Qty: 300 ML | Refills: 0 | Status: SHIPPED | OUTPATIENT
Start: 2024-01-10 | End: 2024-01-20

## 2024-01-10 RX ORDER — CIPROFLOXACIN AND DEXAMETHASONE 3; 1 MG/ML; MG/ML
4 SUSPENSION/ DROPS AURICULAR (OTIC) 2 TIMES DAILY
Qty: 1 EACH | Refills: 0 | Status: SHIPPED | OUTPATIENT
Start: 2024-01-10 | End: 2024-01-17

## 2024-01-10 ASSESSMENT — ENCOUNTER SYMPTOMS
ABDOMINAL PAIN: 0
RESPIRATORY NEGATIVE: 1
COUGH: 0
SORE THROAT: 0
VOMITING: 0
RHINORRHEA: 1
DIARRHEA: 0

## 2024-01-10 NOTE — PROGRESS NOTES
amount of clear fluid in the canal     Nose: Rhinorrhea present. No congestion.      Mouth/Throat:      Mouth: Mucous membranes are moist.      Pharynx: Oropharynx is clear. No oropharyngeal exudate or posterior oropharyngeal erythema.   Eyes:      General: No scleral icterus.        Right eye: No discharge.         Left eye: No discharge.      Conjunctiva/sclera: Conjunctivae normal.      Pupils: Pupils are equal, round, and reactive to light.   Cardiovascular:      Rate and Rhythm: Normal rate and regular rhythm.      Pulses: Normal pulses.      Heart sounds: Normal heart sounds.   Pulmonary:      Effort: Pulmonary effort is normal. No respiratory distress.      Breath sounds: Normal breath sounds. No stridor. No wheezing, rhonchi or rales.   Chest:      Chest wall: No tenderness.   Abdominal:      General: Bowel sounds are normal. There is no distension.      Palpations: Abdomen is soft.      Tenderness: There is no abdominal tenderness.   Musculoskeletal:         General: No tenderness or deformity. Normal range of motion.      Cervical back: Normal range of motion and neck supple.   Lymphadenopathy:      Cervical: No cervical adenopathy.   Skin:     General: Skin is warm and dry.      Capillary Refill: Capillary refill takes less than 2 seconds.      Findings: No rash ( no rash to visible skin).   Neurological:      General: No focal deficit present.      Mental Status: He is alert and oriented to person, place, and time.      Motor: No abnormal muscle tone.      Coordination: Coordination normal.   Psychiatric:         Mood and Affect: Mood normal.         Behavior: Behavior normal.       /74 (Site: Left Lower Arm, Position: Sitting, Cuff Size: Large Adult)   Pulse 78   Temp 98.7 °F (37.1 °C) (Infrared)   SpO2 98%     Assessment:       Diagnosis Orders   1. Non-recurrent acute suppurative otitis media of left ear without spontaneous rupture of tympanic membrane        2. Acute otitis externa of left

## 2024-01-10 NOTE — FLOWSHEET NOTE
[x] [facility]   Outpatient Rehabilitation & Therapy  [address]  P: [phone]   F: [phone]     Physical Therapy Cancel/No Show note    Date: 1/10/2024  Patient: Carlos Chu  : 1990  MRN: [id number]    Visit Count:   Cancels/No Shows to date:     For today's appointment patient:    [x]  Cancelled    [] Rescheduled appointment    [] No-show     Reason given by patient:    []  Patient ill    []  Conflicting appointment    [] No transportation      [] Conflict with work    [] No reason given    [] Weather related    [] COVID-19    [x] Other:      Comments:  has an ear ache and is unable to come in.        [] Next appointment was confirmed    Electronically signed by: Onesimo Ulrich PTA

## 2024-01-10 NOTE — PATIENT INSTRUCTIONS
Do not submerge head under water for at least 1 week and while symptoms last  No fluids in ear  You may shower, BUT first put Vaseline on cotton ball and set gently just inside ear so that water can not get in ear.  Return worseFollow up Family Doctor in 2 weeks for ear recheck  Return worse, new symptoms develop, symptoms persist or have any questions or concerns  If over 6 months old, then may alternate Tylenol/Motrin every 3 hours as needed for pain or fever - take per package instructions  If under 6 months old, do not use Motrin (Ibuprofen), use Tylenol only, Take per package instructions  Cool mist humidifier bedsideFollow up Family Doctor in 2 weeks for ear recheck  Return worse, new symptoms develop, symptoms persist or have any questions or concerns  If over 6 months old, then may alternate Tylenol/Motrin every 3 hours as needed for pain or fever - take per package instructions  If under 6 months old, do not use Motrin (Ibuprofen), use Tylenol only, Take per package instructions  Cool mist humidifier bedside

## 2024-01-15 ENCOUNTER — HOSPITAL ENCOUNTER (OUTPATIENT)
Dept: PHYSICAL THERAPY | Age: 34
Setting detail: THERAPIES SERIES
Discharge: HOME | End: 2024-01-15
Payer: COMMERCIAL

## 2024-01-15 PROCEDURE — 97113 AQUATIC THERAPY/EXERCISES: CPT

## 2024-01-15 NOTE — FLOWSHEET NOTE
[x] [facility]   Outpatient Rehabilitation & Therapy  [address]  P: [phone]   F: [phone]    Physical Therapy Daily Treatment Note    Date:  1/15/2024  Patient Name:  Carlos Chu    :  1990  MRN: [id number]  Physician: Gretchen Luciano APRN - RAMAKRISHNA                             Insurance:  [place] with 30/30 visits remaining and Auth after Eval  Medical Diagnosis: M25.561, G89.29 (ICD-10-CM) - Chronic pain of right knee     Rehab Codes: M62. 81 , M25. 561   Onset Date: chronic              Next 's appt: none at this time  Visit# / total visits:   Cancels/No Shows: 0/0    Subjective:  Pt reports pain in knee is about average for this time of the day.  Notes pain usually increased with increased activity but also with too much sitting.  States pain is usually all over knee andgets sharp shooting pain in joint.  Pain:  [x] Yes  [] No Location: R knee anterior near patella, general aching throughout knee.   Pain Rating: (0-10 scale) 5/10  Pain altered Tx:  [x] No  [] Yes  Action:  Comments: Initial aquatic therapy visit.  Educated on postural awareness with emphasis on core stability, benefits of aquatic therapy and working in pain free ranges with all exercises.      Objective:    [facility]   Rehabilitation Services Exercise Log  Aquatic Knee phase 1    Date of Eval:  23                             Primary PT: Neha Mathews PT  Diagnosis:      Date 1/15/24       Visit # 2/12       Walk F/L/R 2 Laps       Marching 10x       Squats 10x5\"       Heel toe raises 10x       SLR F/L/R 10x       HS Curl 10x       Step-Ups F/L        Step Down F/L        Lunges F/L        Knee/Flex /Ext                Kickboard Ex.        Iso Abd.         Push-pull        Paddling                Balance        Tandem        SLS                DEEP H2O 1 Noodle       Cycling        Jacks        X-Country        Hang 3'               Stretches        Achllies 2x20\"       Hamstring

## 2024-01-22 ENCOUNTER — HOSPITAL ENCOUNTER (OUTPATIENT)
Dept: PHYSICAL THERAPY | Age: 34
Setting detail: THERAPIES SERIES
Discharge: HOME | End: 2024-01-22
Payer: COMMERCIAL

## 2024-01-22 PROCEDURE — 97113 AQUATIC THERAPY/EXERCISES: CPT

## 2024-01-22 NOTE — FLOWSHEET NOTE
restriction/dysfunction    Pt. Education:  [x] Yes  [] No  [x] Reviewed Prior HEP/Ed- benefits of aquatic therapy, postural awareness, core stability and working in pain free ranges with all exercises.   Method of Education: [x] Verbal  [x] Demo  [] Written  Comprehension of Education:  [x] Verbalizes understanding.  [x] Demonstrates understanding.  [] Needs review.  [] Demonstrates/verbalizes HEP/Ed previously given.     Plan: [x] Continue per plan of care.   [] Other:      Treatment Charges: Mins Units   []  Modalities     []  Ther Exercise     []  Manual Therapy     []  Ther Activities     [x]  Aquatics 35 2   []  Neuromuscular     [] Vasocompression     [] Gait Training     [] Dry needling        [] 1 or 2 muscles        [] 3 or more muscles     []  Other     Total Billable time 35 2     Time In: 1110             Time Out: 1150    Electronically signed by:  Onesimo Ulrich PTA

## 2024-01-24 ENCOUNTER — HOSPITAL ENCOUNTER (OUTPATIENT)
Facility: CLINIC | Age: 34
Discharge: HOME | End: 2024-01-26
Payer: COMMERCIAL

## 2024-01-24 ENCOUNTER — HOSPITAL ENCOUNTER (OUTPATIENT)
Dept: GENERAL RADIOLOGY | Facility: CLINIC | Age: 34
Discharge: HOME | End: 2024-01-26
Payer: COMMERCIAL

## 2024-01-24 ENCOUNTER — HOSPITAL ENCOUNTER (OUTPATIENT)
Dept: PHYSICAL THERAPY | Age: 34
Setting detail: THERAPIES SERIES
Discharge: HOME | End: 2024-01-24
Payer: COMMERCIAL

## 2024-01-24 DIAGNOSIS — M25.561 CHRONIC PAIN OF RIGHT KNEE: ICD-10-CM

## 2024-01-24 DIAGNOSIS — G89.29 CHRONIC PAIN OF RIGHT KNEE: ICD-10-CM

## 2024-01-24 PROCEDURE — 73562 X-RAY EXAM OF KNEE 3: CPT

## 2024-01-24 PROCEDURE — 97113 AQUATIC THERAPY/EXERCISES: CPT

## 2024-01-24 NOTE — FLOWSHEET NOTE
2x20\"     Hamstring 2x20\" 2x20\" 2x20\"     Psoas        Quad                Cool Down 1 Lap 1 Lap 1 Lap     Pain Rating 4 2           Specific Instructions for next treatment: Add kick board exercises and lunges next visit.    Assessment: [x] Progressing toward goals.  Progressed marching to laps for dynamic stability and balance.  Increased reps with all exercises for further strengthening and stability benefits.  Also progressed deep water duration of exercises and added cross-country for LE endurance and hip strengthening.  Good tolerance to all exercises with mild fatigue and soreness once back full WB on pool deck.      [] No change.     [] Other:    [x] Patient would continue to benefit from skilled physical therapy services in order to: control pain levels, decrease compressive joint forces and improve overall activity tolerance to therapeutic interventions.       STG: (to be met in 6 treatments)  Pt will self report worst pain no greater than 6-7/10 in order to better tolerate ADLs/work activities with minimal dysfunction  Pt will decrease 19.98 to 17 seconds on FTST in order to demonstrate improved functional tolerances at PLOF with minimal restriction/dysfunction  LTG: (to be met in 12 treatments)  Pt will demonstrate improved JANNIE global LE strength to 4/5 or greater in order to demonstrate improved stability/strength necessary for unrestricted ADLs/work activities  Pt will decrease 54 to 40 on WOMAC in order to demonstrate improved functional tolerances at PLOF with minimal restriction/dysfunction  Pt will demonstrate independence with a long term HEP for continued progress/maintenance after completion of PT  Pt will decrease 15 seconds on FTST in order to demonstrate improved functional tolerances at PLOF with minimal restriction/dysfunction    Pt. Education:  [x] Yes  [] No  [x] Reviewed Prior HEP/Ed- Postural awareness with all exercises performed.    Method of Education: [x] Verbal  [x] Demo  []

## 2024-01-29 ENCOUNTER — HOSPITAL ENCOUNTER (OUTPATIENT)
Dept: PHYSICAL THERAPY | Age: 34
Setting detail: THERAPIES SERIES
Discharge: HOME | End: 2024-01-29
Payer: COMMERCIAL

## 2024-01-29 PROCEDURE — 97113 AQUATIC THERAPY/EXERCISES: CPT

## 2024-01-29 NOTE — FLOWSHEET NOTE
Balance        Tandem        SLS                DEEP H2O 1 Noodle 1 Noodle 1 Noodle 1 Noodle    Cycling  1' 2' 2'    Jacks  1' 1' 2'    X-Country   1' 1'    Hang 3' 5' 5' 2'            Stretches        Achllies 2x20\" 2x20\" 2x20\" 2x20\"    Hamstring 2x20\" 2x20\" 2x20\" 2x20\"    Psoas        Quad                Cool Down 1 Lap 1 Lap 1 Lap 1 Lap    Pain Rating 4 2  2         Specific Instructions for next treatment: Add kick board exercises and lunges next visit.    Assessment: [x] Progressing toward goals.  Progressed to tall step for step ups for LE strengthening, increased time of deep water jacks for LE/hip endurance and encouraged to use no UE support for further stability training.   Good technique with exercises and no issues with balance or pain noted by patient.  Add kick board exercises and lunges next visit.      [] No change.     [] Other:    [x] Patient would continue to benefit from skilled physical therapy services in order to: control pain levels, decrease compressive joint forces and improve overall activity tolerance to therapeutic interventions.       STG: (to be met in 6 treatments)  Pt will self report worst pain no greater than 6-7/10 in order to better tolerate ADLs/work activities with minimal dysfunction  Pt will decrease 19.98 to 17 seconds on FTST in order to demonstrate improved functional tolerances at PLOF with minimal restriction/dysfunction  LTG: (to be met in 12 treatments)  Pt will demonstrate improved JANNIE global LE strength to 4/5 or greater in order to demonstrate improved stability/strength necessary for unrestricted ADLs/work activities  Pt will decrease 54 to 40 on WOMAC in order to demonstrate improved functional tolerances at PLOF with minimal restriction/dysfunction  Pt will demonstrate independence with a long term HEP for continued progress/maintenance after completion of PT  Pt will decrease 15 seconds on FTST in order to demonstrate improved functional tolerances at PLOF

## 2024-01-31 ENCOUNTER — HOSPITAL ENCOUNTER (OUTPATIENT)
Dept: PHYSICAL THERAPY | Age: 34
Setting detail: THERAPIES SERIES
Discharge: HOME | End: 2024-01-31
Payer: COMMERCIAL

## 2024-01-31 PROCEDURE — 97113 AQUATIC THERAPY/EXERCISES: CPT

## 2024-01-31 NOTE — FLOWSHEET NOTE
[x] [facility]   Outpatient Rehabilitation & Therapy  [address]  P: [phone]   F: [phone]    Physical Therapy Daily Treatment Note    Date:  2024  Patient Name:  Carlos Chu    :  1990  MRN: [id number]  Physician: Gretchen Luciano APRN - RAMAKRISHNA                             Insurance:  [place] with 30/30 visits remaining and Auth after Eval  Medical Diagnosis: M25.561, G89.29 (ICD-10-CM) - Chronic pain of right knee     Rehab Codes: M62. 81 , M25. 561   Onset Date: chronic              Next 's appt: none at this time  Visit# / total visits:   Cancels/No Shows: 0/0    Subjective:  Pt reports no issues after last therapy visit.  States pain usually better for a few hours but by the evening or next morning pain is back up.  States being on his feet and active (squatting, lifting) still cause increased pain.  Descending stairs are still painful at times.   Pain:  [x] Yes  [] No Location: R knee anterior near patella, general aching throughout knee.   Pain Rating: (0-10 scale) 4/10  Pain altered Tx:  [x] No  [] Yes  Action:  Comments: reviewed postural awareness with emphasis on core stability, benefits of aquatic therapy and working in pain free ranges with all exercises.      Objective:    [facility]   Rehabilitation Services Exercise Log  Aquatic Knee phase 1    Date of Eval:  23                             Primary PT: Neha Mathews, PT      Date 1/15/24 1/22/24 1/24/24 1/29/24 1/31/24   Visit # 2/12 3/12 4/12 5/12 6/12   Walk F/L/R 2 Laps 2 Laps 2 Laps 2 Laps  2 Laps   Marching 10x 10x 2 Laps 2 Laps 2 Laps   Squats 10x5\" 10x5\" 12x5\" 10x5\" 15x5\"   Heel toe raises 10x 10x 12x 12x 15x   SLR F/L/R 10x 10x 12x 12x 15x   HS Curl 10x 10x 12x 12x 15x   Step-Ups F/L  Low 10x Low 12x Tall 10x Tall 15x   Step Down F/L        Heel taps L         Lunges F/L        Knee/Flex /Ext  10x 12x 12x 15x           Kickboard Ex.     Lg Chest Deep   Iso Abd.      10x5\"

## 2024-02-01 ENCOUNTER — OFFICE VISIT (OUTPATIENT)
Dept: GASTROENTEROLOGY | Age: 34
End: 2024-02-01

## 2024-02-01 VITALS
WEIGHT: 315 LBS | BODY MASS INDEX: 60.62 KG/M2 | HEART RATE: 95 BPM | DIASTOLIC BLOOD PRESSURE: 91 MMHG | OXYGEN SATURATION: 97 % | SYSTOLIC BLOOD PRESSURE: 146 MMHG

## 2024-02-01 DIAGNOSIS — K21.9 GASTROESOPHAGEAL REFLUX DISEASE, UNSPECIFIED WHETHER ESOPHAGITIS PRESENT: ICD-10-CM

## 2024-02-01 DIAGNOSIS — R13.19 ESOPHAGEAL DYSPHAGIA: Primary | ICD-10-CM

## 2024-02-01 RX ORDER — PANTOPRAZOLE SODIUM 40 MG/1
40 TABLET, DELAYED RELEASE ORAL
Qty: 30 TABLET | Refills: 5 | Status: SHIPPED | OUTPATIENT
Start: 2024-02-01

## 2024-02-01 ASSESSMENT — ENCOUNTER SYMPTOMS
BLOOD IN STOOL: 0
NAUSEA: 1
ABDOMINAL PAIN: 0
ANAL BLEEDING: 0
ABDOMINAL DISTENTION: 0
DIARRHEA: 0
TROUBLE SWALLOWING: 1
RECTAL PAIN: 0
VOMITING: 0
CONSTIPATION: 0

## 2024-02-01 NOTE — PROGRESS NOTES
Reason for Referral:   Gretchen Luciano, APRN - CNP  [address] [place] [address]    Chief Complaint   Patient presents with    New Patient     Globus sensation, Dysphagia,and Heartburn        Nausea     Patient c/o nausea             HISTORY OF PRESENT ILLNESS: Mr.Corbin SAMEERA Chu is a 33 y.o. male , referred for evaluation of  dysphagia  ,GERD, globus     Here for the first time    Dysphagia   Mainly to solid  No food impaction no weight loss no bleeding   For 1-2 years getting worse   Staretd after COVUD   Does have Ht burn 1-2 /week   Tums helps a little  Labs DEc /2023: normal cbc /LFTs      Past Medical,Family, and Social History reviewed and does contribute to the patient presentingcondition.        I did review all the labs results available for the labs which were ordered by the primary care physician, and the other consultants, we search on [employer] at [facility] and all the available care everywhere epic    I did review all the imaging studies of the abdomen available on EMR, ordered by the primary care physician and the other consultant    I did review all the pathology from the biopsies done on the previous endoscopies        Patient's PMH/PSH,SH,PSYCH Hx, MEDs, ALLERGIES, and ROS were all reviewed and updated in the appropriate sections.    PAST MEDICAL HISTORY:  Past Medical History:   Diagnosis Date    ADHD (attention deficit hyperactivity disorder) 2000    IEP starting in 4th grade    Allergic rhinitis 1998    Anxiety 2019    Arthritis     Asthma 1998    Bipolar disorder (HCC) 2023    Being looked into    Chronic pain of right knee     COVID     states he has Covid 5 times    Depression 2019    Gout     Hypertension First told in 2021    Obesity 2015    Sleep apnea 2021    Substance abuse (HCC) THC between 7319-9988    No longer smoke    Vitamin D deficiency        Past Surgical History:   Procedure Laterality Date    TONSILLECTOMY      WISDOM TOOTH EXTRACTION         CURRENT

## 2024-02-05 ENCOUNTER — HOSPITAL ENCOUNTER (OUTPATIENT)
Dept: PHYSICAL THERAPY | Age: 34
Setting detail: THERAPIES SERIES
Discharge: HOME | End: 2024-02-05
Payer: COMMERCIAL

## 2024-02-05 PROCEDURE — 97113 AQUATIC THERAPY/EXERCISES: CPT

## 2024-02-05 NOTE — FLOWSHEET NOTE
functional tolerances at PLOF with minimal restriction/dysfunction    Pt. Education:  [x] Yes  [] No  [x] Reviewed Prior HEP/Ed- Postural awareness with all exercises performed.    Method of Education: [x] Verbal  [x] Demo  [] Written  Comprehension of Education:  [x] Verbalizes understanding.  [x] Demonstrates understanding.  [] Needs review.  [] Demonstrates/verbalizes HEP/Ed previously given.     Plan: [x] Continue per plan of care.   [] Other:      Treatment Charges: Mins Units   []  Modalities     []  Ther Exercise     []  Manual Therapy     []  Ther Activities     [x]  Aquatics 39 3   []  Neuromuscular     [] Vasocompression     [] Gait Training     [] Dry needling        [] 1 or 2 muscles        [] 3 or more muscles     []  Other     Total Billable time 39 3     Time In: 1109             Time Out:  1150    Electronically signed by:  Onesimo Ulrich PTA

## 2024-02-07 ENCOUNTER — HOSPITAL ENCOUNTER (OUTPATIENT)
Dept: PHYSICAL THERAPY | Age: 34
Setting detail: THERAPIES SERIES
Discharge: HOME | End: 2024-02-07
Payer: COMMERCIAL

## 2024-02-07 PROCEDURE — 97113 AQUATIC THERAPY/EXERCISES: CPT

## 2024-02-07 NOTE — FLOWSHEET NOTE
[x] [facility]   Outpatient Rehabilitation & Therapy  [address]  P: [phone]   F: [phone]    Physical Therapy Daily Treatment Note    Date:  2024  Patient Name:  Carlos Chu    :  1990  MRN: [id number]  Physician: Gretchen Luciano APRN - RAMAKRISHNA                             Insurance:  [place] with 30/30 visits remaining and Auth after Eval  Medical Diagnosis: M25.561, G89.29 (ICD-10-CM) - Chronic pain of right knee     Rehab Codes: M62. 81 , M25. 561   Onset Date: chronic              Next 's appt: none at this time  Visit# / total visits:  (corrected count)   Cancels/No Shows: 0/0    Subjective:  Pt reports continuing to feel like knee is getting stronger and is having lesser pain with stairs and when sitting for longer periods of time.  States no increased pain after last visit.    Pain:  [x] Yes  [] No Location: R knee anterior near patella, general aching throughout knee.   Pain Rating: (0-10 scale) 4/10  Pain altered Tx:  [x] No  [] Yes  Action:  Comments: reviewed postural awareness with emphasis on core stability, benefits of aquatic therapy and working in pain free ranges with all exercises.      Objective:    [facility]   Rehabilitation Services Exercise Log  Aquatic Knee phase 1    Date of Eval:  23                             Primary PT: Neha Mathews, PT      Date 24   Visit #    Walk F/L/R 2 Laps 2 Laps  2 Laps 2 Laps 2 Laps   Marching 2 Laps 2 Laps 2 Laps 2 Laps 2 Laps   Squats 12x5\" 10x5\" 15x5\" 15x5\" 10x5\"   Heel toe raises 12x 12x 15x NT    SLR F/L/R 12x 12x 15x 15x Steamboats 15x   HS Curl 12x 12x 15x 15x 15x   Step-Ups F/L Low 12x Tall 10x Tall 15x Tall 15x NT   Step Down F/L        Heel taps L     Tall 10x Tall 15x   Lunges F/L    Tall 10x Tall 15x   Knee/Flex /Ext 12x 12x 15x 15x 15x           Kickboard Ex.   Lg Chest Deep Lg Chest Deep Lg Chest Deep   Iso Abd.    10x5\"

## 2024-02-12 ENCOUNTER — HOSPITAL ENCOUNTER (OUTPATIENT)
Dept: PHYSICAL THERAPY | Age: 34
Setting detail: THERAPIES SERIES
Discharge: HOME | End: 2024-02-12
Payer: COMMERCIAL

## 2024-02-12 PROCEDURE — 97113 AQUATIC THERAPY/EXERCISES: CPT

## 2024-02-12 NOTE — FLOWSHEET NOTE
[x] [facility]   Outpatient Rehabilitation & Therapy  [address]  P: [phone]   F: [phone]    Physical Therapy Daily Treatment Note    Date:  2024  Patient Name:  Carlos Chu    :  1990  MRN: [id number]  Physician: Gretchen Luciano APRN - RAMAKRISHNA                             Insurance:  [place] with 30/30 visits remaining and Auth after Eval  Medical Diagnosis: M25.561, G89.29 (ICD-10-CM) - Chronic pain of right knee     Rehab Codes: M62. 81 , M25. 561   Onset Date: chronic              Next 's appt: none at this time  Visit# / total visits:    Cancels/No Shows: 0/0    Subjective:  Pt reports no changes since last visit.  States mild soreness and decrease in pain after last therapy visit for about 2 days but then pt reports he \"feel it again\" but not as intense.       Pain:  [x] Yes  [] No Location: R knee anterior near patella, general aching throughout knee  Pain Rating: (0-10 scale) 4/10  Pain altered Tx:  [x] No  [] Yes  Action:  Comments: reviewed postural awareness with emphasis on core stability, benefits of aquatic therapy and working in pain free ranges with all exercises.      Objective:    [facility]   Rehabilitation Services Exercise Log  Aquatic Knee phase 1    Date of Eval:  23                             Primary PT: Neha Mathews, PT      Date 24   Visit #    Walk F/L/R 2 Laps 2 Laps 2 Laps 2 Laps   Marching 2 Laps 2 Laps 2 Laps 2 Laps   Squats 15x5\" 15x5\" 10x5\" 10x5\"   Heel toe raises 15x NT     SLR F/L/R 15x 15x Steamboats 15x Steam boats 15x   HS Curl 15x 15x 15x 15x   Step-Ups F/L Tall 15x Tall 15x NT Lat up/over 10x   Step Down F/L       Heel taps L   Tall 10x Tall 15x Tall 15x   Lunges F/L  Tall 10x Tall 15x Alt Tall 15x   Knee/Flex /Ext 15x 15x 15x 15x          Kickboard Ex. Lg Chest Deep Lg Chest Deep Lg Chest Deep Lg Chest Deep   Iso Abd.  10x5\" 10x5\" 10x5\" 10x5\"   Push-pull 15x

## 2024-02-15 ENCOUNTER — HOSPITAL ENCOUNTER (OUTPATIENT)
Dept: PHYSICAL THERAPY | Age: 34
Setting detail: THERAPIES SERIES
Discharge: HOME | End: 2024-02-15
Payer: COMMERCIAL

## 2024-02-15 PROCEDURE — 97113 AQUATIC THERAPY/EXERCISES: CPT

## 2024-02-15 PROCEDURE — 97110 THERAPEUTIC EXERCISES: CPT

## 2024-02-15 NOTE — PROGRESS NOTES
[x] [facility]   Outpatient Rehabilitation & Therapy  [address]  P: [phone]   F: [phone]    Physical Therapy Progress Note    Date: 2/15/2024      Patient: Carlos Cuh  : 1990  MRN: [id number]    Physician: Gretchen Luciano APRN - CNP       Insurance: [place] with 30/ visits remaining and Auth after    Diagnosis: M25.561, G89.29 (ICD-10-CM) - Chronic pain of right knee       Onset Date: chronic   Next  Appt: none at this time  Total visits attended: 10/16  Cancels/No shows: 0/0  Date of initial visit: 2024    Formal reporting period 2024 - 2/15/2024               Subjective:  Pt comes in stating that he does notice some decreases in pain with starting physical therapy. He reports that after aquatics he notices his pain is lower and that it lasts a few days with low pain before it comes back. Reports that he does feel like therapy is helping slowly with the pain and notices differences in the few days after pool therapy. ADLs are easier within the past few days after the pool as well but go back to increasing the pain about 3 days after pool sessions.    Pain:  [x] Yes  [] No  Location: R knee Pain Rating: (0-10 scale) 5/10  Pain altered Tx:  [x] No  [] Yes  Action:  Comments:  Pain worse: 7-8/10  Pain best: 3-4/10    Objective:  Test Measurements:      Range of Motion  Left Range of Motion  Right Strength  Left Strength  Right   Hip Flexion     4+ 4   Hip Abduction     4+ 4+   Hip Adduction     4+ 4+   Hip Extension     4 4-   Knee Flexion WFL WFL 4+ 4+   Knee Extension WFL WFL 5 4+   Dorsiflexion     5 5   Plantar flexion     5 5       Function:  Five Time Sit to Stand (FTSTS):   Previous Score: 19.98 seconds   Current Score: 13.79 seconds    Functional Assessment Used: WOMAC  Previous Score: 54/96  Current Status Score: 4196  Goal Status Score: 4096    Assessment:  Pt comes in for progress note after completing 10/12 visits on their POC

## 2024-02-15 NOTE — FLOWSHEET NOTE
15x 15x          Kickboard Ex. Lg Chest Deep Lg Chest Deep Lg Chest Deep Lg Chest deep   Iso Abd.  10x5\" 10x5\" 10x5\" 10x5\"   Push-pull 15x 15x 15x 15x   Paddling   15x 15x          Balance       Tandem       SLS              DEEP H2O 1 Noodle 1 Noodle 1 Noodle 1 Noodle   Cycling 2' 2' 2' 3'   Jacks 2' 2' 2' 3'   X-Country 2' 2' 2' 2'   Hang 2' 2' 2' 2'          Stretches       Achllies 2x20\" 2x20\" 2x20\" 2x20\"   Hamstring 2x20\" 2x20\" 2x20\" 2x20\"    Psoas       Quad              Cool Down 2 Laps -- 2 Laps 2 Laps   Pain Rating  1-2        Specific Instructions for next treatment:  add small buoyancy cuffs next visit.      Assessment: [x] Progressing toward goals.   Continued to emphasize core stability and postural awareness and encouraged patient to increase speeds with all exercises for added core stability work and strengthening.  Denies any issues with increased speed.  Progressed squats to low box for more depth on squats.  Added forward step downs for eccentric quad strengthening and increased duration of deep water cycling for endurance of LEs.        [] No change.     [] Other:    [x] Patient would continue to benefit from skilled physical therapy services in order to: control pain levels, decrease compressive joint forces and improve overall activity tolerance to therapeutic interventions.       STG: (to be met in 6 treatments)  Pt will self report worst pain no greater than 6-7/10 in order to better tolerate ADLs/work activities with minimal dysfunction  Pt will decrease 19.98 to 17 seconds on FTST in order to demonstrate improved functional tolerances at PLOF with minimal restriction/dysfunction  LTG: (to be met in 12 treatments)  Pt will demonstrate improved JANNIE global LE strength to 4/5 or greater in order to demonstrate improved stability/strength necessary for unrestricted ADLs/work activities  Pt will decrease 54 to 40 on WOMAC in order to demonstrate improved functional tolerances at PLOF with minimal

## 2024-02-22 ENCOUNTER — HOSPITAL ENCOUNTER (OUTPATIENT)
Dept: PREADMISSION TESTING | Age: 34
Discharge: HOME | End: 2024-02-26

## 2024-02-22 VITALS — HEIGHT: 72 IN | WEIGHT: 315 LBS | BODY MASS INDEX: 42.66 KG/M2

## 2024-02-22 NOTE — PROGRESS NOTES
Pre-op Instructions For Out-Patient Endoscopy Surgery    Medication Instructions:  Please stop herbs and any supplements now (includes vitamins and minerals).    Please contact your surgeon and prescribing physician for pre-op instructions for any blood thinners.    If you have inhalers/aerosol treatments at home, please use them the morning of your surgery and bring the inhalers with you to the hospital.  Will bring rescue inhaler with him  Please take the following medications the morning of your surgery with a sip of water:    none    Surgery Instructions:  After midnight before surgery:  Do not eat or drink anything, including water, mints, gum, and hard candy.  You may brush your teeth without swallowing.  No smoking, chewing tobacco, or street drugs.    Please shower or bathe before surgery.       Please do not wear any cologne, lotion, powder, jewelry, piercings, perfume, makeup, nail polish, hair accessories, or hair spray on the day of surgery.  Wear loose comfortable clothing.    Leave your valuables at home but bring a payment source for any after-surgery prescriptions you plan to fill at [facility].  Bring a storage case for any glasses/contacts.    An adult who is responsible for you MUST drive you home and should be with you for the first 24 hours after surgery.     The Day of Surgery:  Arrive at [facility] Surgery Entrance at the time directed by your surgeon and check in at the desk.     If you have a living will or healthcare power of , please bring a copy.    You will be taken to the pre-op holding area where you will be prepared for surgery.  A physical assessment will be performed by a nurse practitioner or house officer.  Your IV will be started and you will meet your anesthesiologist.    When you go to surgery, your family will be directed to the surgical waiting room, where the doctor should speak with them after your surgery.    After surgery, you will be

## 2024-02-27 ENCOUNTER — APPOINTMENT (OUTPATIENT)
Dept: PHYSICAL THERAPY | Age: 34
End: 2024-02-27
Payer: COMMERCIAL

## 2024-02-27 NOTE — PRE-PROCEDURE INSTRUCTIONS
No answer, left message ?                             Unable to leave message ?    When were you told to arrive at hospital ?  0730    Do you have a  ?YES    Are you on any blood thinners ? NO                    If yes when did you stop taking ?    Do you have your prep Rx filled and instruction ? N/A     Nothing to eat the day before , only clear liquids.    Are you experiencing any covid symptoms ? NO    Do you have any infections or rash we should be aware of ?NO      Do you have the Hibiclens soap to use the night before and the morning of surgery ?N/A    Nothing to eat or drink after midnight, only a sip of water to take any medication instructed to take the night before.  Wear comfortable clothing, leave any valuables at home, remove any jewelry and body piercing .

## 2024-02-28 ENCOUNTER — ANESTHESIA EVENT (OUTPATIENT)
Dept: ENDOSCOPY | Age: 34
End: 2024-02-28
Payer: COMMERCIAL

## 2024-02-29 ENCOUNTER — ANESTHESIA (OUTPATIENT)
Dept: ENDOSCOPY | Age: 34
End: 2024-02-29
Payer: COMMERCIAL

## 2024-02-29 ENCOUNTER — HOSPITAL ENCOUNTER (OUTPATIENT)
Age: 34
Setting detail: OUTPATIENT SURGERY
Discharge: HOME | End: 2024-02-29
Attending: INTERNAL MEDICINE | Admitting: INTERNAL MEDICINE
Payer: COMMERCIAL

## 2024-02-29 ENCOUNTER — APPOINTMENT (OUTPATIENT)
Dept: PHYSICAL THERAPY | Age: 34
End: 2024-02-29
Payer: COMMERCIAL

## 2024-02-29 VITALS
SYSTOLIC BLOOD PRESSURE: 119 MMHG | BODY MASS INDEX: 42.66 KG/M2 | DIASTOLIC BLOOD PRESSURE: 82 MMHG | HEART RATE: 86 BPM | TEMPERATURE: 98 F | WEIGHT: 315 LBS | RESPIRATION RATE: 19 BRPM | HEIGHT: 72 IN | OXYGEN SATURATION: 98 %

## 2024-02-29 DIAGNOSIS — R13.19 ESOPHAGEAL DYSPHAGIA: ICD-10-CM

## 2024-02-29 DIAGNOSIS — K21.9 GASTROESOPHAGEAL REFLUX DISEASE, UNSPECIFIED WHETHER ESOPHAGITIS PRESENT: ICD-10-CM

## 2024-02-29 PROCEDURE — 3700000001 HC ADD 15 MINUTES (ANESTHESIA): Performed by: INTERNAL MEDICINE

## 2024-02-29 PROCEDURE — 43239 EGD BIOPSY SINGLE/MULTIPLE: CPT | Performed by: INTERNAL MEDICINE

## 2024-02-29 PROCEDURE — 2709999900 HC NON-CHARGEABLE SUPPLY: Performed by: INTERNAL MEDICINE

## 2024-02-29 PROCEDURE — 7100000010 HC PHASE II RECOVERY - FIRST 15 MIN: Performed by: INTERNAL MEDICINE

## 2024-02-29 PROCEDURE — 3609012400 HC EGD TRANSORAL BIOPSY SINGLE/MULTIPLE: Performed by: INTERNAL MEDICINE

## 2024-02-29 PROCEDURE — 3700000000 HC ANESTHESIA ATTENDED CARE: Performed by: INTERNAL MEDICINE

## 2024-02-29 PROCEDURE — 88305 TISSUE EXAM BY PATHOLOGIST: CPT

## 2024-02-29 PROCEDURE — 2580000003 HC RX 258: Performed by: ANESTHESIOLOGY

## 2024-02-29 PROCEDURE — 6360000002 HC RX W HCPCS: Performed by: NURSE ANESTHETIST, CERTIFIED REGISTERED

## 2024-02-29 PROCEDURE — 7100000011 HC PHASE II RECOVERY - ADDTL 15 MIN: Performed by: INTERNAL MEDICINE

## 2024-02-29 PROCEDURE — 2500000003 HC RX 250 WO HCPCS: Performed by: ANESTHESIOLOGY

## 2024-02-29 PROCEDURE — 2500000003 HC RX 250 WO HCPCS: Performed by: NURSE ANESTHETIST, CERTIFIED REGISTERED

## 2024-02-29 RX ORDER — PROPOFOL 10 MG/ML
INJECTION, EMULSION INTRAVENOUS PRN
Status: DISCONTINUED | OUTPATIENT
Start: 2024-02-29 | End: 2024-02-29 | Stop reason: SDUPTHER

## 2024-02-29 RX ORDER — KETAMINE HYDROCHLORIDE 50 MG/ML
INJECTION, SOLUTION INTRAMUSCULAR; INTRAVENOUS PRN
Status: DISCONTINUED | OUTPATIENT
Start: 2024-02-29 | End: 2024-02-29 | Stop reason: SDUPTHER

## 2024-02-29 RX ORDER — SODIUM CHLORIDE 9 MG/ML
INJECTION, SOLUTION INTRAVENOUS PRN
Status: DISCONTINUED | OUTPATIENT
Start: 2024-02-29 | End: 2024-02-29 | Stop reason: HOSPADM

## 2024-02-29 RX ORDER — SODIUM CHLORIDE, SODIUM LACTATE, POTASSIUM CHLORIDE, CALCIUM CHLORIDE 600; 310; 30; 20 MG/100ML; MG/100ML; MG/100ML; MG/100ML
INJECTION, SOLUTION INTRAVENOUS CONTINUOUS
Status: DISCONTINUED | OUTPATIENT
Start: 2024-02-29 | End: 2024-02-29 | Stop reason: HOSPADM

## 2024-02-29 RX ORDER — SODIUM CHLORIDE 0.9 % (FLUSH) 0.9 %
5-40 SYRINGE (ML) INJECTION EVERY 12 HOURS SCHEDULED
Status: DISCONTINUED | OUTPATIENT
Start: 2024-02-29 | End: 2024-02-29 | Stop reason: HOSPADM

## 2024-02-29 RX ORDER — SODIUM CHLORIDE 0.9 % (FLUSH) 0.9 %
5-40 SYRINGE (ML) INJECTION PRN
Status: DISCONTINUED | OUTPATIENT
Start: 2024-02-29 | End: 2024-02-29 | Stop reason: HOSPADM

## 2024-02-29 RX ORDER — LIDOCAINE HYDROCHLORIDE 20 MG/ML
INJECTION, SOLUTION INFILTRATION; PERINEURAL PRN
Status: DISCONTINUED | OUTPATIENT
Start: 2024-02-29 | End: 2024-02-29 | Stop reason: SDUPTHER

## 2024-02-29 RX ORDER — LIDOCAINE HYDROCHLORIDE 10 MG/ML
1 INJECTION, SOLUTION EPIDURAL; INFILTRATION; INTRACAUDAL; PERINEURAL
Status: COMPLETED | OUTPATIENT
Start: 2024-02-29 | End: 2024-02-29

## 2024-02-29 RX ORDER — MIDAZOLAM HYDROCHLORIDE 1 MG/ML
INJECTION INTRAMUSCULAR; INTRAVENOUS PRN
Status: DISCONTINUED | OUTPATIENT
Start: 2024-02-29 | End: 2024-02-29 | Stop reason: SDUPTHER

## 2024-02-29 RX ADMIN — MIDAZOLAM 2 MG: 1 INJECTION INTRAMUSCULAR; INTRAVENOUS at 08:49

## 2024-02-29 RX ADMIN — LIDOCAINE HYDROCHLORIDE 1 ML: 10 INJECTION, SOLUTION EPIDURAL; INFILTRATION; INTRACAUDAL; PERINEURAL at 08:10

## 2024-02-29 RX ADMIN — SODIUM CHLORIDE, POTASSIUM CHLORIDE, SODIUM LACTATE AND CALCIUM CHLORIDE: 600; 310; 30; 20 INJECTION, SOLUTION INTRAVENOUS at 08:10

## 2024-02-29 RX ADMIN — KETAMINE HYDROCHLORIDE 30 MG: 50 INJECTION, SOLUTION INTRAMUSCULAR; INTRAVENOUS at 08:50

## 2024-02-29 RX ADMIN — KETAMINE HYDROCHLORIDE 20 MG: 50 INJECTION, SOLUTION INTRAMUSCULAR; INTRAVENOUS at 08:52

## 2024-02-29 RX ADMIN — LIDOCAINE HYDROCHLORIDE 100 MG: 20 INJECTION, SOLUTION EPIDURAL; INFILTRATION; INTRACAUDAL; PERINEURAL at 08:50

## 2024-02-29 RX ADMIN — PROPOFOL 400 MG: 10 INJECTION, EMULSION INTRAVENOUS at 08:50

## 2024-02-29 ASSESSMENT — ENCOUNTER SYMPTOMS
NAUSEA: 0
SHORTNESS OF BREATH: 1
SHORTNESS OF BREATH: 0
SINUS PRESSURE: 0
ABDOMINAL PAIN: 0

## 2024-02-29 ASSESSMENT — PAIN - FUNCTIONAL ASSESSMENT
PAIN_FUNCTIONAL_ASSESSMENT: NONE - DENIES PAIN
PAIN_FUNCTIONAL_ASSESSMENT: NONE - DENIES PAIN

## 2024-02-29 NOTE — H&P
HISTORY and PHYSICAL  [facility]       NAME:  Carlos Chu  MRN: [id number]   YOB: 1990   Date: 2/29/2024   Age: 33 y.o.  Gender: male       COMPLAINT AND PRESENT HISTORY:                 Carlos Chu is 33 y.o., male, undergoing for EGD BIOPSY.  ESOPHAGOGASTRODUODENOSCOPY.    Pt is being seen for hx of:  Pre-Op Diagnosis Codes:     * Esophageal dysphagia      * Gastroesophageal reflux disease, unspecified whether esophagitis present     Patient has not  had previous endoscopies done .  this is the first.    Patient  admits to intermittent heartburn, indigestion, acid reflux (GERD).  Pt is taking Protonix.     Pt admits to some dysphagia since Covid last year.  He states that he has had Covid x5.  Pt states that when he tries to eat he has sensation that something is stuck.  He states that he has to fatemeh his food and pills with lots of water.    No  regurgitation.     Pt denies any epigastric pain, nausea or  vomiting.  No changes in appetite.   Pt  denies  changes in bowel habits.  Denies any  blood in stools, or tarry stools.      Medical history reviewed: MURTAZA on CPAP,  obese.  asthma is seasonal.    Patient voices feeling well today. Denies any recent fever or chills, chest pain /pressure . Pt reports  SOB..     Hx of smoking :  no     Patient has been NPO since midnight. No blood thinners     Pt denies any issues with Anesthesia.     RECENT LABS, IMAGING AND TESTING     Lab Results   Component Value Date    WBC 9.1 12/22/2023    RBC 5.64 12/22/2023    HGB 16.5 12/22/2023    HCT 50.2 12/22/2023    MCV 89.0 12/22/2023    MCH 29.3 12/22/2023    MCHC 32.9 12/22/2023    RDW 12.2 12/22/2023     12/22/2023    MPV 9.7 12/22/2023        Lab Results   Component Value Date     12/22/2023    K 5.1 12/22/2023     12/22/2023    CO2 25 12/22/2023    BUN 18 12/22/2023    CREATININE 1.0 12/22/2023    GLUCOSE 101 (H) 12/22/2023    CALCIUM 9.6 12/22/2023    PROT 7.2

## 2024-02-29 NOTE — OP NOTE
PROCEDURE NOTE    DATE OF PROCEDURE: 2/29/2024     SURGEON: Alannah Gonsalez MD  Facility: \"[facility]  ASSISTANT: None  Anesthesia: MAC  PREOPERATIVE DIAGNOSIS:   Dysphagia    Diagnosis:    As below     POSTOPERATIVE DIAGNOSIS: As described below    OPERATION: Upper GI endoscopy with Biopsy    ANESTHESIA: Moderate Sedation     ESTIMATED BLOOD LOSS: Less than 50 ml    COMPLICATIONS: None.     SPECIMENS:  Was Obtained:   As below    HISTORY: The patient is a 33 y.o. year old male with history of above preop diagnosis.  I recommended esophagogastroduodenoscopy with possible biopsy and I explained the risk, benefits, expected outcome, and alternatives to the procedure.  Risks included but are not limited to bleeding, infection, respiratory distress, hypotension, and perforation of the esophagus, stomach, or duodenum.  Patient understands and is in agreement.      The patient was counseled at length about the risks of luca Covid-19 during their perioperative period and any recovery window from their procedure.  The patient was made aware that luca Covid-19  may worsen their prognosis for recovering from their procedure  and lend to a higher morbidity and/or mortality risk.  All material risks, benefits, and reasonable alternatives including postponing the procedure were discussed. The patient does wish to proceed with the procedure at this time.         PROCEDURE: The patient was given IV conscious sedation.  The patient's SPO2 remained above 90% throughout the procedure.The gastroscope was inserted orally and advanced under direct vision through the esophagus, through the stomach, through the pylorus, and into the descending duodenum.      Post sedation note :The patient's SPO2 remained above 90% throughout the procedure.the vital signs remained stable , and no immediate complication form the procedure noted, patient will be ready for d/c when criteria is met .      Findings:    Retropharyngeal area

## 2024-02-29 NOTE — DISCHARGE INSTRUCTIONS
EGD DISCHARGE INSTRUCTIONS    Activity:  Rest today. No driving, operating machinery, or making any important decisions today. May resume normal activity tomorrow.    Diet:  Following EGD eat slowly, chew food well, cut food into small pieces-Avoid greasy, spicy, \"crunchy\" foods X 48 hours.     Call your Doctor if you have any of the following:  -Passing blood rectally or vomiting blood (it may be red or black).  -Persistent nausea/vomiting  -Severe abdominal or chest pain not relieved with passing gas  -Fever of 100 degrees or more  -Redness or swelling at the IV site  -Severe sore throat or neck pain    Sedation or General Anesthesia, Adult  Care After  Refer to this sheet in the next 24 hours. These instructions provide you with information on caring for yourself after your procedure. Your caregiver may also give you more specific instructions. Your treatment has been planned according to current medical practices, but problems sometimes occur. Call your caregiver if you have any problems or questions after your procedure.   HOME CARE INSTRUCTIONS   Do not participate in any activities that require you to be alert or coordinated. Do not:  Drive.  Swim.  Ride a bicycle.  Operate heavy machinery.  Cook.  Use power tools.  Climb ladders.  Work at heights.  Take a bath.  Do not drink alcohol.  Do not make any important decisions or sign legal documents.  Stay with an adult.  The first meal following your procedure should be light and small. Avoid solid foods if you feel sick to your stomach (nauseous) or if you throw up (vomit).  Drink enough fluids to keep your urine clear or pale yellow.  Only take your usual medicines or new medicines if your caregiver approves them.  Only take over-the-counter or prescription medicines for pain, discomfort, or fever as directed by your caregiver.  Keep all follow-up appointments as directed by your caregiver.  SEEK IMMEDIATE MEDICAL CARE IF:   You are not feeling normal or

## 2024-02-29 NOTE — ANESTHESIA PRE PROCEDURE
Department of Anesthesiology  Preprocedure Note       Name:  Carlos Chu   Age:  33 y.o.  :  1990                                          MRN:  [id number]         Date:  2024      Surgeon: Surgeon(s):  Alannah Gonsalez MD    Procedure: Procedure(s):  ESOPHAGOGASTRODUODENOSCOPY BIOPSY    Medications prior to admission:   Prior to Admission medications    Medication Sig Start Date End Date Taking? Authorizing Provider   pantoprazole (PROTONIX) 40 MG tablet Take 1 tablet by mouth every morning (before breakfast) 24   Alannah Gonsalez MD   busPIRone (BUSPAR) 10 MG tablet Take 1 tablet by mouth in the morning and 1 tablet in the evening. 1/10/24   Marianna Ruffin MD   vitamin D (ERGOCALCIFEROL) 1.25 MG (57979 UT) CAPS capsule Take 1 capsule by mouth once a week 24   Gretchen Luciano APRN - CNP   albuterol sulfate HFA (PROVENTIL;VENTOLIN;PROAIR) 108 (90 Base) MCG/ACT inhaler Inhale 2 puffs by mouth 4 times daily as needed for Wheezing 23   Gretchen Luciano APRN - CNP   guanFACINE (TENEX) 1 MG tablet Take 2 tablets by mouth every morning 23   Marianna Ruffin MD   hydrOXYzine HCl (ATARAX) 50 MG tablet Take 1 tablet by mouth every 8 hours as needed for Anxiety 11/15/23   Marianna Ruffin MD   ZOLOFT 50 MG tablet Take 2 tablets by mouth daily 23   Marianna Ruffin MD   albuterol (PROVENTIL) (2.5 MG/3ML) 0.083% nebulizer solution Take 3 mLs by nebulization every 4 hours as needed for Wheezing or Shortness of Breath 23   Gretchen Luciano APRN - CNP   fluticasone (FLONASE) 50 MCG/ACT nasal spray 2 sprays by Each Nostril route daily 23   Gretchen Luciano APRN - CNP   sodium chloride (OCEAN) 0.65 % nasal spray 1 spray by Nasal route as needed for Congestion 22   Bell Calhoun MD       Current medications:    Current Facility-Administered Medications   Medication Dose Route Frequency Provider Last Rate Last Admin   • lidocaine PF 1 % injection 1 mL  1 mL

## 2024-02-29 NOTE — ANESTHESIA POSTPROCEDURE EVALUATION
Department of Anesthesiology  Postprocedure Note    Patient: Carlos Chu  MRN: [id number]  YOB: 1990  Date of evaluation: 2/29/2024    Procedure Summary       Date: 02/29/24 Room / Location: [place] [address] / [facility]    Anesthesia Start: 0845 Anesthesia Stop: 0909    Procedure: ESOPHAGOGASTRODUODENOSCOPY BIOPSY (Esophagus) Diagnosis:       Esophageal dysphagia      Gastroesophageal reflux disease, unspecified whether esophagitis present      (Esophageal dysphagia [R13.19])      (Gastroesophageal reflux disease, unspecified whether esophagitis present [K21.9])    Surgeons: Alannah Gonsalez MD Responsible Provider: Greg Kenny MD    Anesthesia Type: TIVA ASA Status: 3            Anesthesia Type: TIVA    Deyvi Phase I:      Deyvi Phase II: Deyvi Score: 8    Anesthesia Post Evaluation    Patient location during evaluation: PACU  Patient participation: complete - patient participated  Level of consciousness: awake and alert  Airway patency: patent  Nausea & Vomiting: no vomiting  Cardiovascular status: hemodynamically stable  Respiratory status: acceptable  Hydration status: euvolemic  Comments: POST- ANESTHESIA EVALUATION       Pt Name: Carlos Chu  MRN: [id number]  YOB: 1990  Date of evaluation: 2/29/2024  Time:  10:15 AM      /82   Pulse 86   Temp 98 °F (36.7 °C)   Resp 19   Ht 1.829 m (6')   Wt (!) 192.8 kg (425 lb)   SpO2 98%   BMI 57.64 kg/m²      Consciousness Level  Awake  Cardiopulmonary Status  Stable  Pain Adequately Treated YES  Nausea / Vomiting  NO  Adequate Hydration  YES  Anesthesia Related Complications NONE      Electronically signed by Greg Kenny MD on 2/29/2024 at 10:15 AM         Pain management: satisfactory to patient    No notable events documented.

## 2024-03-01 LAB — SURGICAL PATHOLOGY REPORT: NORMAL

## 2024-03-05 ENCOUNTER — OFFICE VISIT (OUTPATIENT)
Dept: INTERNAL MEDICINE CLINIC | Age: 34
End: 2024-03-05
Payer: COMMERCIAL

## 2024-03-05 VITALS
HEIGHT: 72 IN | OXYGEN SATURATION: 96 % | HEART RATE: 71 BPM | BODY MASS INDEX: 42.66 KG/M2 | DIASTOLIC BLOOD PRESSURE: 86 MMHG | WEIGHT: 315 LBS | SYSTOLIC BLOOD PRESSURE: 132 MMHG

## 2024-03-05 DIAGNOSIS — J45.20 MILD INTERMITTENT ASTHMA WITHOUT COMPLICATION: ICD-10-CM

## 2024-03-05 DIAGNOSIS — G47.33 OSA (OBSTRUCTIVE SLEEP APNEA): Primary | ICD-10-CM

## 2024-03-05 DIAGNOSIS — D22.30 CHANGE IN FACIAL MOLE: ICD-10-CM

## 2024-03-05 DIAGNOSIS — J30.2 SEASONAL ALLERGIC RHINITIS, UNSPECIFIED TRIGGER: ICD-10-CM

## 2024-03-05 DIAGNOSIS — E66.01 MORBID OBESITY WITH BMI OF 60.0-69.9, ADULT (HCC): ICD-10-CM

## 2024-03-05 PROCEDURE — 3079F DIAST BP 80-89 MM HG: CPT | Performed by: NURSE PRACTITIONER

## 2024-03-05 PROCEDURE — 99214 OFFICE O/P EST MOD 30 MIN: CPT | Performed by: NURSE PRACTITIONER

## 2024-03-05 PROCEDURE — G8484 FLU IMMUNIZE NO ADMIN: HCPCS | Performed by: NURSE PRACTITIONER

## 2024-03-05 PROCEDURE — G8417 CALC BMI ABV UP PARAM F/U: HCPCS | Performed by: NURSE PRACTITIONER

## 2024-03-05 PROCEDURE — 3075F SYST BP GE 130 - 139MM HG: CPT | Performed by: NURSE PRACTITIONER

## 2024-03-05 PROCEDURE — G8427 DOCREV CUR MEDS BY ELIG CLIN: HCPCS | Performed by: NURSE PRACTITIONER

## 2024-03-05 PROCEDURE — 1036F TOBACCO NON-USER: CPT | Performed by: NURSE PRACTITIONER

## 2024-03-05 RX ORDER — ALBUTEROL SULFATE 90 UG/1
2 AEROSOL, METERED RESPIRATORY (INHALATION) EVERY 4 HOURS PRN
Qty: 18 G | Refills: 0 | Status: SHIPPED | OUTPATIENT
Start: 2024-03-05

## 2024-03-05 RX ORDER — ALBUTEROL SULFATE 2.5 MG/3ML
2.5 SOLUTION RESPIRATORY (INHALATION) EVERY 4 HOURS PRN
Qty: 120 EACH | Refills: 3 | Status: SHIPPED | OUTPATIENT
Start: 2024-03-05

## 2024-03-05 RX ORDER — FLUTICASONE PROPIONATE 50 MCG
2 SPRAY, SUSPENSION (ML) NASAL DAILY
Qty: 48 G | Refills: 1 | Status: SHIPPED | OUTPATIENT
Start: 2024-03-05

## 2024-03-05 ASSESSMENT — PATIENT HEALTH QUESTIONNAIRE - PHQ9
3. TROUBLE FALLING OR STAYING ASLEEP: NOT AT ALL
SUM OF ALL RESPONSES TO PHQ9 QUESTIONS 1 & 2: 0
4. FEELING TIRED OR HAVING LITTLE ENERGY: NOT AT ALL
7. TROUBLE CONCENTRATING ON THINGS, SUCH AS READING THE NEWSPAPER OR WATCHING TELEVISION: NOT AT ALL
10. IF YOU CHECKED OFF ANY PROBLEMS, HOW DIFFICULT HAVE THESE PROBLEMS MADE IT FOR YOU TO DO YOUR WORK, TAKE CARE OF THINGS AT HOME, OR GET ALONG WITH OTHER PEOPLE: NOT DIFFICULT AT ALL
8. MOVING OR SPEAKING SO SLOWLY THAT OTHER PEOPLE COULD HAVE NOTICED. OR THE OPPOSITE, BEING SO FIGETY OR RESTLESS THAT YOU HAVE BEEN MOVING AROUND A LOT MORE THAN USUAL: NOT AT ALL
SUM OF ALL RESPONSES TO PHQ QUESTIONS 1-9: 0
2. FEELING DOWN, DEPRESSED OR HOPELESS: NOT AT ALL
1. LITTLE INTEREST OR PLEASURE IN DOING THINGS: NOT AT ALL
6. FEELING BAD ABOUT YOURSELF - OR THAT YOU ARE A FAILURE OR HAVE LET YOURSELF OR YOUR FAMILY DOWN: NOT AT ALL
9. THOUGHTS THAT YOU WOULD BE BETTER OFF DEAD, OR OF HURTING YOURSELF: NOT AT ALL
5. POOR APPETITE OR OVEREATING: NOT AT ALL

## 2024-03-06 ENCOUNTER — TELEPHONE (OUTPATIENT)
Dept: INTERNAL MEDICINE CLINIC | Age: 34
End: 2024-03-06

## 2024-03-06 ENCOUNTER — HOSPITAL ENCOUNTER (OUTPATIENT)
Dept: PHYSICAL THERAPY | Age: 34
Setting detail: THERAPIES SERIES
Discharge: HOME | End: 2024-03-06
Payer: COMMERCIAL

## 2024-03-06 DIAGNOSIS — E66.01 MORBID OBESITY WITH BMI OF 60.0-69.9, ADULT (HCC): Primary | ICD-10-CM

## 2024-03-06 NOTE — FLOWSHEET NOTE
[x] [facility]   Outpatient Rehabilitation & Therapy  [address]  P: [phone]   F: [phone]     Physical Therapy Cancel/No Show note    Date: 3/6/2024  Patient: Carlos Chu  : 1990  MRN: [id number]    Visit Count:   Cancels/No Shows to date:     For today's appointment patient:    [x]  Cancelled    [] Rescheduled appointment    [] No-show     Reason given by patient:    []  Patient ill    []  Conflicting appointment    [] No transportation      [] Conflict with work    [] No reason given    [] Weather related    [] COVID-19    [x] Other:      Comments:  was on his way to therapy and car broke down.  Confirmed next visit.       [x] Next appointment was confirmed    Electronically signed by: Onesimo Ulrich PTA

## 2024-03-07 NOTE — TELEPHONE ENCOUNTER
Ok, please let him know it was denied. I have placed a referral to Weight Clinic instead (we talked about it at the visit).   Orders Placed This Encounter   Procedures    Sekou Guerra DO, Weight Management and Bariatrics, [facility]     Referral Priority:   Routine     Referral Type:   Eval and Treat     Referral Reason:   Specialty Services Required     Referred to Provider:   Sekou Castle DO     Requested Specialty:   Bariatric Surgery     Number of Visits Requested:   1

## 2024-03-08 ENCOUNTER — HOSPITAL ENCOUNTER (OUTPATIENT)
Dept: PHYSICAL THERAPY | Age: 34
Setting detail: THERAPIES SERIES
Discharge: HOME | End: 2024-03-08
Payer: COMMERCIAL

## 2024-03-08 PROCEDURE — 97113 AQUATIC THERAPY/EXERCISES: CPT

## 2024-03-08 NOTE — FLOWSHEET NOTE
continued progress/maintenance after completion of PT  Pt will decrease 15 seconds on FTST in order to demonstrate improved functional tolerances at PLOF with minimal restriction/dysfunction    Pt. Education:  [x] Yes  [] No  [x] Reviewed Prior HEP/Ed- increased speeds for added resistance and to challenge knee/core stability further.   Method of Education: [x] Verbal  [x] Demo  [] Written  Comprehension of Education:  [x] Verbalizes understanding.  [x] Demonstrates understanding.  [] Needs review.  [] Demonstrates/verbalizes HEP/Ed previously given.     Plan: [x] Continue per plan of care.   [] Other:      Treatment Charges: Mins Units   []  Modalities     []  Ther Exercise     []  Manual Therapy     []  Ther Activities     [x]  Aquatics 31 2   []  Neuromuscular     [] Vasocompression     [] Gait Training     [] Dry needling        [] 1 or 2 muscles        [] 3 or more muscles     []  Other     Total Billable time       Time In: 1131             Time Out:  1202      Electronically signed by:  Onesimo Ulrich PTA

## 2024-03-11 ENCOUNTER — HOSPITAL ENCOUNTER (OUTPATIENT)
Dept: PHYSICAL THERAPY | Age: 34
Setting detail: THERAPIES SERIES
Discharge: HOME | End: 2024-03-11
Payer: COMMERCIAL

## 2024-03-11 PROCEDURE — 97113 AQUATIC THERAPY/EXERCISES: CPT

## 2024-03-11 NOTE — FLOWSHEET NOTE
[x] [facility]   Outpatient Rehabilitation & Therapy  [address]  P: [phone]   F: [phone]    Physical Therapy Daily Treatment Note    Date:  3/11/2024  Patient Name:  Carlos Chu    :  1990  MRN: [id number]  Physician: Gretchen Luciano APRN - RAMAKRISHNA                             Insurance:  [place] with 30/30 visits remaining and Auth after Eval  Medical Diagnosis: M25.561, G89.29 (ICD-10-CM) - Chronic pain of right knee     Rehab Codes: M62. 81 , M25. 561   Onset Date: chronic              Next 's appt: none at this time  Visit# / total visits:    Cancels/No Shows: 0/0    Subjective:  Pt reports right knee has felt better.  No increased sxs with Aquatic exercise program.      Pain:  [x] Yes  [] No Location: R knee anterior near patella, general aching throughout knee  Pain Rating: (0-10 scale) 5/10  Pain altered Tx:  [x] No  [] Yes  Action:  Comments: reviewed postural awareness with emphasis on core stability, benefits of aquatic therapy and working in pain free ranges with all exercises.      Objective:    [facility]   Rehabilitation Services Exercise Log  Aquatic Knee phase 1    Date of Eval:  23                             Primary PT: Neha Mathews, PT      Date 2/5/24 2/7/24 2/12/24 2/15/24 3/11/24   Visit # 7/12 8/12 9/12 10/12 12/16   Walk F/L/R 2 Laps 2 Laps 2 Laps 2 Laps 2 Laps   Marching 2 Laps 2 Laps 2 Laps 2 Laps 2 Laps   Squats 15x5\" 10x5\" 10x5\" Low 10x5\"  Low 10x5\"   Heel toe raises NT       SLR F/L/R 15x Steamboats 15x Steam boats 15x Steam boats  15x  Steam boats 15x   HS Curl 15x 15x 15x 15x 15x   Step-Ups F/L Tall 15x NT Lat up/over 10x Lat up/over 15x Lat up/over 15x   Step Down F/L    Tall 10x Tall 10x   Heel taps L  Tall 10x Tall 15x Tall 15x Tall 15x Tall 15x   Lunges F/L Tall 10x Tall 15x Alt Tall 15x Alt tall 15x Alt Tall 15x   Knee/Flex /Ext 15x 15x 15x 15x 15x           Kickboard Ex. Lg Chest Deep Lg Chest Deep Lg Chest Deep

## 2024-03-14 ENCOUNTER — HOSPITAL ENCOUNTER (OUTPATIENT)
Dept: PHYSICAL THERAPY | Age: 34
Setting detail: THERAPIES SERIES
Discharge: HOME | End: 2024-03-14
Payer: COMMERCIAL

## 2024-03-14 PROCEDURE — 97113 AQUATIC THERAPY/EXERCISES: CPT

## 2024-03-14 NOTE — FLOWSHEET NOTE
squat position   Iso Abd.  10x5\" 10x5\" 10x5\"    Push-pull 15x 15x 15x 15x   Paddling 15x 15x 15x 15x          Balance       Tandem       SLS              DEEP H2O 1 Noodle 1 Noodle 1 Noodle 1 Noodle   Cycling 2' 3' 3' 5'   Jacks 2' 3' 3' 3'   X-Country 2' 2' 2' 2'   Hang 2' 2' 2' 2'          Stretches       Achllies 2x20\" 2x20\" 2x20\" 2x20\"   Hamstring 2x20\" 2x20\"  2x20\" 2x20\"   Psoas       Quad              Cool Down 2 Laps 2 Laps 2 Laps 2 Laps   Pain Rating          Specific Instructions for next treatment:  add small buoyancy cuffs next visit.      Assessment: [] Progressing toward goals.         [x] No change.  No changes made due to elevated pain upon arrival.  Increased speed of all LE exercises to challenge hip/LE stability and strength.  Denies any pain with exercises performed.  Will add buoyancy cuffs next visit to challenge core stability.  Also progressed kick board exercise to being performed in semi-squat position for stability of hips ans knees.       [] Other:    [x] Patient would continue to benefit from skilled physical therapy services in order to: control pain levels, decrease compressive joint forces and improve overall activity tolerance to therapeutic interventions.       Goals: Updated/Assessed 2/15/2024:  STG: (to be met in 6 treatments)  Pt will self report worst pain no greater than 6-7/10 in order to better tolerate ADLs/work activities with minimal dysfunction -GOAL PROGRESSING 2/15  Pt will decrease 19.98 to 17 seconds on FTST in order to demonstrate improved functional tolerances at PLOF with minimal restriction/dysfunction-GOAL MET 2/15  LTG: (to be met in 12 treatments)  Pt will demonstrate improved JANNIE global LE strength to 4/5 or greater in order to demonstrate improved stability/strength necessary for unrestricted ADLs/work activities -GOAL PARTIALLY MET 2/15  Pt will decrease 54 to 40 on WOMAC in order to demonstrate improved functional tolerances at PLOF with minimal

## 2024-03-18 ENCOUNTER — HOSPITAL ENCOUNTER (OUTPATIENT)
Dept: PHYSICAL THERAPY | Age: 34
Setting detail: THERAPIES SERIES
Discharge: HOME | End: 2024-03-18
Payer: COMMERCIAL

## 2024-03-18 PROCEDURE — 97113 AQUATIC THERAPY/EXERCISES: CPT

## 2024-03-18 NOTE — FLOWSHEET NOTE
[x] [facility]   Outpatient Rehabilitation & Therapy  [address]  P: [phone]   F: [phone]    Physical Therapy Daily Treatment Note    Date:  3/18/2024  Patient Name:  Carlos Chu    :  1990  MRN: [id number]  Physician: Gretchen Luciano APRN - RAMAKRISHNA                             Insurance:  [place] with 30/30 visits remaining and Auth after Eval  Medical Diagnosis: M25.561, G89.29 (ICD-10-CM) - Chronic pain of right knee     Rehab Codes: M62. 81 , M25. 561   Onset Date: chronic              Next 's appt: none at this time  Visit# / total visits:    Cancels/No Shows: 0/0    Subjective:  Pt reports still having pain with sitting too long and then going to stand.  States overall his pain remains more manageable to decreased since starting therapy.  Notes walking around the car junk yard looking for car parts without issue yesterday.  Denies any issues after last visit.      Pain:  [x] Yes  [] No Location: R knee anterior near patella, general aching throughout knee  Pain Rating: (0-10 scale) 4/10  Pain altered Tx:  [x] No  [] Yes  Action:  Comments: reviewed postural awareness with emphasis on core stability, benefits of aquatic therapy and working in pain free ranges with all exercises.      Objective:    [facility]   Rehabilitation Services Exercise Log  Aquatic Knee phase 1    Date of Eval:  23                             Primary PT: Neha Mathews, PT      Date 2/12/24 2/15/24 3/11/24 3/14/24 3/18/24    Visit # 9/12 10/12 12/16 13/16 14/16    Walk F/L/R 2 Laps 2 Laps 2 Laps 2 Laps 2 Laps    Marching 2 Laps 2 Laps 2 Laps 2 Laps 2 Laps         Low box Small buoyancy cuffs    Squats 10x5\" Low 10x5\"  Low 10x5\" Low 10x5\" Low 15x5\"    Heel toe raises         SLR F/L/R Steam boats 15x Steam boats  15x  Steam boats 15x Steam boats  15x Steam boats 15x    HS Curl 15x 15x 15x 15x 15x    Step-Ups F/L Lat up/over 10x Lat up/over 15x Lat up/over 15x Lat

## 2024-03-19 ASSESSMENT — ENCOUNTER SYMPTOMS
COUGH: 0
SHORTNESS OF BREATH: 1
CONSTIPATION: 0
DIARRHEA: 0
EYE DISCHARGE: 0
WHEEZING: 0
ABDOMINAL PAIN: 1

## 2024-03-20 ENCOUNTER — APPOINTMENT (OUTPATIENT)
Dept: PHYSICAL THERAPY | Age: 34
End: 2024-03-20
Payer: COMMERCIAL

## 2024-03-21 ENCOUNTER — HOSPITAL ENCOUNTER (OUTPATIENT)
Dept: PHYSICAL THERAPY | Age: 34
Setting detail: THERAPIES SERIES
Discharge: HOME | End: 2024-03-21
Payer: COMMERCIAL

## 2024-03-21 PROCEDURE — 97113 AQUATIC THERAPY/EXERCISES: CPT

## 2024-03-21 NOTE — FLOWSHEET NOTE
15x 15x 15x            Kickboard Ex. Lg Chest deep Large squat position Large Squat position Large- Squat position    Iso Abd.  10x5\"       Push-pull 15x 15x 15x 15x    Paddling 15x 15x 15x 15x            Balance        Tandem        SLS     W/ UE ROM (Horiz Abd/Add and Alt F/E)           DEEP H2O 1 Noodle 1 Noodle 1 Noodle 1 Noodle    Cycling 3' 5' 5' 2'    Jacks 3' 3' 3' 3'    X-Country 2' 2' 3' 3'    Hang 2' 2' 3' 3'            Stretches        Achllies 2x20\" 2x20\" 2x20\" 2x20\"    Hamstring 2x20\" 2x20\" 2x20\" 2x20\"    Psoas        Quad                Cool Down 2 Laps 2 Laps  2 Laps 2 Laps    Pain Rating 2 4 2 3       Specific Instructions for next treatment:  Progress note next visit; add buoyancy cuffs for increased challenge    Assessment: [x] Progressing toward goals.  Completed program to tolerance as noted above, emphasis on speed for increased resistance. Patient tolerates WB LE program well without UE support at small step level for increased WB (~50% in warm water environment). Continued work on taller box with forward and lateral step downs to build LE strength along with unloaded activity for aerobic capacity. Patient denies increased pain post session.       [] No change.       [] Other:    [x] Patient would continue to benefit from skilled physical therapy services in order to: control pain levels, decrease compressive joint forces and improve overall activity tolerance to therapeutic interventions.       Goals: Updated/Assessed 2/15/2024:  STG: (to be met in 6 treatments)  Pt will self report worst pain no greater than 6-7/10 in order to better tolerate ADLs/work activities with minimal dysfunction -GOAL PROGRESSING 2/15  Pt will decrease 19.98 to 17 seconds on FTST in order to demonstrate improved functional tolerances at PLOF with minimal restriction/dysfunction-GOAL MET 2/15  LTG: (to be met in 12 treatments)  Pt will demonstrate improved JANNIE global LE strength to 4/5 or greater in order to

## 2024-03-22 ENCOUNTER — APPOINTMENT (OUTPATIENT)
Dept: PHYSICAL THERAPY | Age: 34
End: 2024-03-22
Payer: COMMERCIAL

## 2024-03-26 ENCOUNTER — OFFICE VISIT (OUTPATIENT)
Dept: BARIATRICS/WEIGHT MGMT | Age: 34
End: 2024-03-26
Payer: COMMERCIAL

## 2024-03-26 ENCOUNTER — HOSPITAL ENCOUNTER (OUTPATIENT)
Age: 34
Setting detail: SPECIMEN
Discharge: HOME | End: 2024-03-26

## 2024-03-26 VITALS
WEIGHT: 315 LBS | BODY MASS INDEX: 42.66 KG/M2 | HEART RATE: 78 BPM | HEIGHT: 72 IN | DIASTOLIC BLOOD PRESSURE: 84 MMHG | RESPIRATION RATE: 18 BRPM | SYSTOLIC BLOOD PRESSURE: 138 MMHG

## 2024-03-26 DIAGNOSIS — G47.33 OBSTRUCTIVE SLEEP APNEA SYNDROME: ICD-10-CM

## 2024-03-26 DIAGNOSIS — E66.01 CLASS 3 SEVERE OBESITY DUE TO EXCESS CALORIES WITH SERIOUS COMORBIDITY AND BODY MASS INDEX (BMI) OF 60.0 TO 69.9 IN ADULT (HCC): Primary | ICD-10-CM

## 2024-03-26 DIAGNOSIS — I10 HYPERTENSION, UNSPECIFIED TYPE: ICD-10-CM

## 2024-03-26 DIAGNOSIS — E66.01 CLASS 3 SEVERE OBESITY DUE TO EXCESS CALORIES WITH SERIOUS COMORBIDITY AND BODY MASS INDEX (BMI) OF 60.0 TO 69.9 IN ADULT (HCC): ICD-10-CM

## 2024-03-26 LAB
INSULIN REFERENCE RANGE:: NORMAL
INSULIN: 15.3 MU/L

## 2024-03-26 PROCEDURE — G8417 CALC BMI ABV UP PARAM F/U: HCPCS | Performed by: NURSE PRACTITIONER

## 2024-03-26 PROCEDURE — 3075F SYST BP GE 130 - 139MM HG: CPT | Performed by: NURSE PRACTITIONER

## 2024-03-26 PROCEDURE — 1036F TOBACCO NON-USER: CPT | Performed by: NURSE PRACTITIONER

## 2024-03-26 PROCEDURE — 3079F DIAST BP 80-89 MM HG: CPT | Performed by: NURSE PRACTITIONER

## 2024-03-26 PROCEDURE — G8427 DOCREV CUR MEDS BY ELIG CLIN: HCPCS | Performed by: NURSE PRACTITIONER

## 2024-03-26 PROCEDURE — G8484 FLU IMMUNIZE NO ADMIN: HCPCS | Performed by: NURSE PRACTITIONER

## 2024-03-26 PROCEDURE — 99215 OFFICE O/P EST HI 40 MIN: CPT | Performed by: NURSE PRACTITIONER

## 2024-03-26 RX ORDER — PHENTERMINE HYDROCHLORIDE 37.5 MG/1
37.5 TABLET ORAL
Qty: 30 TABLET | Refills: 0 | Status: SHIPPED | OUTPATIENT
Start: 2024-03-26 | End: 2024-04-25

## 2024-03-26 NOTE — PROGRESS NOTES
[facility] INVASIVE BARIATRIC SURG  [address]  [address]  [place] [id number]  Dept: [phone]  Fax: [phone]    MEDICATION WEIGHT LOSS MANAGEMENT PROGRAM  PROGRESS NOTE      CC: Weight Loss      Subjective:       Carlos Chu is a 33 y.o. male who is here today for evaluation and treatment of obesity. Patient cites health, increased physical ability, self-image as reasons for wanting to lose weight.    Obesity History  Weight in late teens: 250 lbs.  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     He has never really been on a \"diet\" for weight loss. He states recently he has been trying to make healthier choices. His mom has been trying to cook healthier meals.    Current Exercise Habits  he states he is exercising twice a week for 45 minutes - walking the dog. No routine exercise.     Current Eating Habits  Number of regular meals per day: 2 - breakfast and dinner  Number of snacking episodes per day: 1  Who shops for food? Mother and father  Who prepares food? mother  Who eats with patient? mother and father  Binge behavior?: no  Purge behavior? no  Anorexic behavior? no  Eating precipitated by stress/emotion? sometimes  How often do you order take out, eat fast food, order pizza, or dine at a sit down restaurant? 2 times per week  Any Nondenominational or cultural food concerns or restrictions? no  Do you tend to graze on food throughout the day? sometimes  Are you a night time eater? yes   Do you eat when you are

## 2024-03-28 ENCOUNTER — HOSPITAL ENCOUNTER (OUTPATIENT)
Dept: PHYSICAL THERAPY | Age: 34
Setting detail: THERAPIES SERIES
Discharge: HOME | End: 2024-03-28
Payer: COMMERCIAL

## 2024-03-28 ENCOUNTER — TELEPHONE (OUTPATIENT)
Dept: BARIATRICS/WEIGHT MGMT | Age: 34
End: 2024-03-28

## 2024-03-28 PROCEDURE — 97113 AQUATIC THERAPY/EXERCISES: CPT

## 2024-03-28 NOTE — FLOWSHEET NOTE
[x] [facility]   Outpatient Rehabilitation & Therapy  [address]  P: [phone]   F: [phone]    Physical Therapy Daily Treatment Note    Date:  3/28/2024  Patient Name:  Carlos Chu    :  1990  MRN: [id number]  Physician: Gretchen Luciano APRN - RAMAKRISHNA                             Insurance:  [place] with 30/30 visits remaining and Auth after Eval  Medical Diagnosis: M25.561, G89.29 (ICD-10-CM) - Chronic pain of right knee     Rehab Codes: M62. 81 , M25. 561   Onset Date: chronic              Next 's appt: none at this time  Visit# / total visits:    Cancels/No Shows: 0/0    Subjective:  continues to report low pain numbers and feeling good.  States pain still more medial on right knee but manageable.      Pain:  [x] Yes  [] No Location: R Knee: anterior, Medial> lateral  Pain Rating: (0-10 scale) 3/10  Pain altered Tx:  [x] No  [] Yes  Action:  Comments:     Objective:    [facility]   Rehabilitation Services Exercise Log  Aquatic Knee phase 1    Date of Eval:  23                             Primary PT: Neha Mathews, PT      Date 3/11/24 3/14/24 3/18/24 3/21/24 3/28/24   Visit # 12/16 13/16 14/16 15/16 16/16   Walk F/L/R 2 Laps 2 Laps 2 Laps 2 Laps 2 Laps   Marching 2 Laps 2 Laps 2 Laps 2 Laps 2 Laps      Low box Low Box Small buoyancy cuffs    Squats Low 10x5\" Low 10x5\" Low 15x5\" Low 15x5\" Tall 15x5\"   Heel toe raises        SLR F/L/R Steam boats 15x Steam boats  15x Steam boats 15x Steam Boats  15x Steam boats 15x   HS Curl 15x 15x 15x 15x 15x   Step-Ups F/L Lat up/over 15x Lat up&over  15x Lat up&over  15x     Step Down F/L Tall 10x Tall 15x Tall 15x Tall Fwd 15x Tall fxd 15   Heel taps L  Tall 15x Tall 15x Tall 15x Tall 15x Tall 15x   Lunges F/L Alt Tall 15x Alt Tall 15x  Alt Tall 15x Alt Tall 15x Alt Tall 15x   Knee/Flex /Ext 15x 15x 15x 15x 15x           Kickboard Ex. Lg Chest deep Large squat position Large Squat position Large- Squat position

## 2024-03-28 NOTE — TELEPHONE ENCOUNTER
I'm not concerned. Those are medications people are on often that we write adipex for and there aren't issue. Possible side effect might be anxiety/irritability/agitation - if that happens - we stop the medication. I dont see that often though

## 2024-03-28 NOTE — DISCHARGE SUMMARY
[x] [facility]   Outpatient Rehabilitation & Therapy  [address]  P: [phone]   F: [phone]    Physical Therapy Progress Note / Discharge Summary    Date: 3/28/2024      Patient: Carlos Chu  : 1990  MRN: [id number]    Physician: Gretchen Luciano APRN - CNP       Insurance: [place] with 30/30 visits remaining and Auth after    Diagnosis: M25.561, G89.29 (ICD-10-CM) - Chronic pain of right knee       Onset Date: chronic   Next  Appt: none at this time  Total visits attended:   Cancels/No shows: 0/0  Date of initial visit: 2024    Formal reporting period 2/15/2024 - 3/28/2024    Subjective:  Pt comes in 15 minutes late a this date reporting that he thought it was at 9:30 am. Pt overall reports that he feels like overall his knee pain as improved since starting aquatic therapy and feels like he is able to do more with less pain / prolonged time before pain increases. States that he has been also walking more as well. Is going to be looking into doing a wellness group that does more walking along with possibly looking into the [employer] but states to have a pool anyway's that he can use during the summer.    Pain:  [x] Yes  [] No  Location: R knee Pain Rating: (0-10 scale) 3/10  Pain altered Tx:  [x] No  [] Yes  Action:  Comments:  Pain worse: 6-7/10  Pain at best: 3/10    Objective:  Test Measurements:      Range of Motion  Left Range of Motion  Right Strength  Left Strength  Right   Hip Flexion     4+ 4+   Hip Abduction     4+ 4+   Hip Adduction     4+ 4+   Hip Extension     4 4   Knee Flexion WFL WFL 4+ 4+   Knee Extension WFL WFL 5 4+   Dorsiflexion     5 5   Plantar flexion     5 5     Function:  Five Time Sit to Stand (FTSTS):   Previous Score: 19.98 seconds , 13.79 seconds  Current Score: 12.30 seconds    Functional Assessment Used: WOMAC  Previous Score: 54/96 , 41/96  Current Status Score: 33/96  Goal Status Score: 40/96    Assessment:  Pt comes in

## 2024-03-28 NOTE — TELEPHONE ENCOUNTER
Patient picked up his Adipex today and is concerned that it states there could be side effects with some of the meds he is on - his concerns are:  Zoloft, Buspart and Tenex.     Please advise

## 2024-04-09 ENCOUNTER — OFFICE VISIT (OUTPATIENT)
Dept: INTERNAL MEDICINE CLINIC | Age: 34
End: 2024-04-09
Payer: COMMERCIAL

## 2024-04-09 VITALS
WEIGHT: 315 LBS | HEART RATE: 76 BPM | BODY MASS INDEX: 42.66 KG/M2 | HEIGHT: 72 IN | SYSTOLIC BLOOD PRESSURE: 130 MMHG | DIASTOLIC BLOOD PRESSURE: 86 MMHG | OXYGEN SATURATION: 98 %

## 2024-04-09 DIAGNOSIS — J45.20 MILD INTERMITTENT ASTHMA WITHOUT COMPLICATION: ICD-10-CM

## 2024-04-09 DIAGNOSIS — J30.2 SEASONAL ALLERGIC RHINITIS, UNSPECIFIED TRIGGER: Primary | ICD-10-CM

## 2024-04-09 PROBLEM — R05.9 COUGH, UNSPECIFIED: Status: RESOLVED | Noted: 2022-01-19 | Resolved: 2024-04-09

## 2024-04-09 PROCEDURE — 99213 OFFICE O/P EST LOW 20 MIN: CPT | Performed by: NURSE PRACTITIONER

## 2024-04-09 PROCEDURE — 3079F DIAST BP 80-89 MM HG: CPT | Performed by: NURSE PRACTITIONER

## 2024-04-09 PROCEDURE — 1036F TOBACCO NON-USER: CPT | Performed by: NURSE PRACTITIONER

## 2024-04-09 PROCEDURE — G8427 DOCREV CUR MEDS BY ELIG CLIN: HCPCS | Performed by: NURSE PRACTITIONER

## 2024-04-09 PROCEDURE — 3075F SYST BP GE 130 - 139MM HG: CPT | Performed by: NURSE PRACTITIONER

## 2024-04-09 PROCEDURE — G8417 CALC BMI ABV UP PARAM F/U: HCPCS | Performed by: NURSE PRACTITIONER

## 2024-04-09 RX ORDER — MONTELUKAST SODIUM 10 MG/1
10 TABLET ORAL NIGHTLY
Qty: 30 TABLET | Refills: 2 | Status: SHIPPED | OUTPATIENT
Start: 2024-04-09

## 2024-04-09 RX ORDER — ALBUTEROL SULFATE 90 UG/1
2 AEROSOL, METERED RESPIRATORY (INHALATION) EVERY 4 HOURS PRN
Qty: 18 G | Refills: 0 | Status: SHIPPED | OUTPATIENT
Start: 2024-04-09

## 2024-04-09 RX ORDER — OLOPATADINE HYDROCHLORIDE 1 MG/ML
1 SOLUTION/ DROPS OPHTHALMIC 2 TIMES DAILY
Qty: 5 ML | Refills: 0 | Status: SHIPPED | OUTPATIENT
Start: 2024-04-09 | End: 2024-06-08

## 2024-04-09 NOTE — PROGRESS NOTES
Visit Information    Have you changed or started any medications since your last visit including any over-the-counter medicines, vitamins, or herbal medicines? no   Are you having any side effects from any of your medications? -  no  Have you stopped taking any of your medications? Is so, why? -  no    Have you seen any other physician or provider since your last visit? Yes - Records Obtained  Have you had any other diagnostic tests since your last visit? Yes - Records Obtained  Have you been seen in the emergency room and/or had an admission to a hospital since we last saw you? Yes - Records Obtained  Have you had your routine dental cleaning in the past 6 months? no    Have you activated your [employer] account? If not, what are your barriers? Yes     Patient Care Team:  Gretchen Luciano APRN - CNP as PCP - General (Internal Medicine)  Gretchen Luciano APRN - CNP as PCP - Empaneled Provider    Medical History Review  Past Medical, Family, and Social History reviewed and does contribute to the patient presenting condition    Health Maintenance   Topic Date Due    Pneumococcal 0-64 years Vaccine (1 of 2 - PCV) Never done    COVID-19 Vaccine (3 - 2023-24 season) 09/01/2023    Depression Monitoring  03/05/2025    DTaP/Tdap/Td vaccine (8 - Td or Tdap) 04/21/2030    Hepatitis B vaccine  Completed    Polio vaccine  Completed    Flu vaccine  Completed    Hepatitis C screen  Completed    HIV screen  Completed    Hepatitis A vaccine  Aged Out    Hib vaccine  Aged Out    HPV vaccine  Aged Out    Meningococcal (ACWY) vaccine  Aged Out    Varicella vaccine  Discontinued    Depression Screen  Discontinued        [facility]  [address] [address]  Dept: [phone]  Dept Fax: [phone]    Office Progress/Follow Up Note  Date of patient's visit: 4/9/2024   Patient's Name:  Carlos Chu  YOB: 1990            Patient Care Team:  Gretchen Luciano APRN - CNP as PCP -

## 2024-04-11 PROBLEM — U07.1 COVID-19: Status: RESOLVED | Noted: 2021-09-30 | Resolved: 2024-04-11

## 2024-04-11 PROBLEM — R07.9 CHEST PAIN, UNSPECIFIED: Status: RESOLVED | Noted: 2022-01-19 | Resolved: 2024-04-11

## 2024-04-11 PROBLEM — Z20.822 CONTACT WITH AND (SUSPECTED) EXPOSURE TO COVID-19: Status: RESOLVED | Noted: 2022-01-19 | Resolved: 2024-04-11

## 2024-04-11 ASSESSMENT — ENCOUNTER SYMPTOMS
NAUSEA: 0
SHORTNESS OF BREATH: 0
RHINORRHEA: 1
COUGH: 0
WHEEZING: 0
ABDOMINAL PAIN: 0
EYE ITCHING: 1
CONSTIPATION: 0
EYE DISCHARGE: 1

## 2024-04-25 ENCOUNTER — OFFICE VISIT (OUTPATIENT)
Dept: GASTROENTEROLOGY | Age: 34
End: 2024-04-25
Payer: COMMERCIAL

## 2024-04-25 VITALS
DIASTOLIC BLOOD PRESSURE: 87 MMHG | WEIGHT: 315 LBS | OXYGEN SATURATION: 95 % | BODY MASS INDEX: 66.7 KG/M2 | SYSTOLIC BLOOD PRESSURE: 149 MMHG | HEART RATE: 86 BPM

## 2024-04-25 DIAGNOSIS — K30 DELAYED GASTRIC EMPTYING: ICD-10-CM

## 2024-04-25 DIAGNOSIS — R11.0 NAUSEA: ICD-10-CM

## 2024-04-25 DIAGNOSIS — K21.9 GASTROESOPHAGEAL REFLUX DISEASE, UNSPECIFIED WHETHER ESOPHAGITIS PRESENT: Primary | ICD-10-CM

## 2024-04-25 DIAGNOSIS — R09.A2 GLOBUS SENSATION: ICD-10-CM

## 2024-04-25 DIAGNOSIS — R13.19 ESOPHAGEAL DYSPHAGIA: ICD-10-CM

## 2024-04-25 DIAGNOSIS — R10.13 EPIGASTRIC PAIN: ICD-10-CM

## 2024-04-25 PROCEDURE — 3077F SYST BP >= 140 MM HG: CPT | Performed by: INTERNAL MEDICINE

## 2024-04-25 PROCEDURE — 1036F TOBACCO NON-USER: CPT | Performed by: INTERNAL MEDICINE

## 2024-04-25 PROCEDURE — 99214 OFFICE O/P EST MOD 30 MIN: CPT | Performed by: INTERNAL MEDICINE

## 2024-04-25 PROCEDURE — G8427 DOCREV CUR MEDS BY ELIG CLIN: HCPCS | Performed by: INTERNAL MEDICINE

## 2024-04-25 PROCEDURE — G8417 CALC BMI ABV UP PARAM F/U: HCPCS | Performed by: INTERNAL MEDICINE

## 2024-04-25 PROCEDURE — 3079F DIAST BP 80-89 MM HG: CPT | Performed by: INTERNAL MEDICINE

## 2024-04-25 ASSESSMENT — ENCOUNTER SYMPTOMS
BLOOD IN STOOL: 0
NAUSEA: 1
ABDOMINAL PAIN: 0
DIARRHEA: 0
VOMITING: 0
TROUBLE SWALLOWING: 1
CONSTIPATION: 0
ANAL BLEEDING: 0
ABDOMINAL DISTENTION: 0
RECTAL PAIN: 0

## 2024-04-25 NOTE — PROGRESS NOTES
BILITOT 0.4 12/22/2023    ALKPHOS 92 12/22/2023    AST 23 12/22/2023    ALT 15 12/22/2023         Lab Results   Component Value Date    RBC 5.64 12/22/2023    HGB 16.5 12/22/2023    MCV 89.0 12/22/2023    MCH 29.3 12/22/2023    MCHC 32.9 12/22/2023    RDW 12.2 12/22/2023    MPV 9.7 12/22/2023    BASOPCT 1 12/22/2023    LYMPHSABS 1.35 12/22/2023    MONOSABS 0.54 12/22/2023    NEUTROABS 6.91 12/22/2023    EOSABS 0.19 12/22/2023    BASOSABS 0.09 12/22/2023         DIAGNOSTIC TESTING:     No results found.       PHYSICAL EXAMINATION: Vital signs reviewed per the nursing documentation.     BP (!) 149/87   Pulse 86   Wt (!) 220 kg (485 lb)   SpO2 95%   BMI 66.70 kg/m²   Body mass index is 66.7 kg/m².   Physical Exam  Constitutional:       General: He is not in acute distress.     Appearance: He is morbidly obese. He is not ill-appearing.   HENT:      Head: Normocephalic.      Mouth/Throat:      Mouth: Mucous membranes are moist. No oral lesions.   Eyes:      General: No scleral icterus.  Cardiovascular:      Rate and Rhythm: Normal rate and regular rhythm.      Heart sounds: No murmur heard.  Pulmonary:      Effort: Pulmonary effort is normal.      Breath sounds: Normal breath sounds.   Abdominal:      General: Bowel sounds are normal. There is no distension.      Palpations: Abdomen is soft. There is no hepatomegaly, splenomegaly or mass.      Tenderness: There is no abdominal tenderness. There is no guarding.   Musculoskeletal:      Right lower leg: Edema present.      Left lower leg: Edema present.   Lymphadenopathy:      Cervical: No cervical adenopathy.      Upper Body:      Right upper body: No supraclavicular adenopathy.   Skin:     General: Skin is warm and dry.      Coloration: Skin is not jaundiced.      Findings: No bruising or rash.   Neurological:      General: No focal deficit present.      Mental Status: He is alert and oriented to person, place, and time. Mental status is at baseline.   Psychiatric:

## 2024-04-26 ENCOUNTER — OFFICE VISIT (OUTPATIENT)
Dept: BARIATRICS/WEIGHT MGMT | Age: 34
End: 2024-04-26
Payer: COMMERCIAL

## 2024-04-26 VITALS
HEART RATE: 70 BPM | HEIGHT: 72 IN | DIASTOLIC BLOOD PRESSURE: 92 MMHG | SYSTOLIC BLOOD PRESSURE: 140 MMHG | BODY MASS INDEX: 42.66 KG/M2 | WEIGHT: 315 LBS

## 2024-04-26 DIAGNOSIS — I10 HYPERTENSION, UNSPECIFIED TYPE: ICD-10-CM

## 2024-04-26 DIAGNOSIS — E55.9 VITAMIN D DEFICIENCY: ICD-10-CM

## 2024-04-26 DIAGNOSIS — E66.01 CLASS 3 SEVERE OBESITY DUE TO EXCESS CALORIES WITH SERIOUS COMORBIDITY AND BODY MASS INDEX (BMI) OF 60.0 TO 69.9 IN ADULT (HCC): ICD-10-CM

## 2024-04-26 DIAGNOSIS — E78.9 BORDERLINE HIGH CHOLESTEROL: ICD-10-CM

## 2024-04-26 DIAGNOSIS — R73.9 ELEVATED BLOOD SUGAR: ICD-10-CM

## 2024-04-26 DIAGNOSIS — G47.33 OSA ON CPAP: Primary | ICD-10-CM

## 2024-04-26 PROCEDURE — 1036F TOBACCO NON-USER: CPT | Performed by: NURSE PRACTITIONER

## 2024-04-26 PROCEDURE — 3077F SYST BP >= 140 MM HG: CPT | Performed by: NURSE PRACTITIONER

## 2024-04-26 PROCEDURE — 99214 OFFICE O/P EST MOD 30 MIN: CPT | Performed by: NURSE PRACTITIONER

## 2024-04-26 PROCEDURE — G8427 DOCREV CUR MEDS BY ELIG CLIN: HCPCS | Performed by: NURSE PRACTITIONER

## 2024-04-26 PROCEDURE — 3080F DIAST BP >= 90 MM HG: CPT | Performed by: NURSE PRACTITIONER

## 2024-04-26 PROCEDURE — G8417 CALC BMI ABV UP PARAM F/U: HCPCS | Performed by: NURSE PRACTITIONER

## 2024-04-26 ASSESSMENT — ENCOUNTER SYMPTOMS
ABDOMINAL PAIN: 0
VOMITING: 0
CONSTIPATION: 0
WHEEZING: 0
CHEST TIGHTNESS: 0
COUGH: 0
NAUSEA: 0
SHORTNESS OF BREATH: 0

## 2024-04-26 NOTE — PROGRESS NOTES
Medical Nutrition Therapy for Non-Surgical Weight Loss  Nutrition Follow-Up: Weight Loss Medication    Nutrition Assessment:  Patient is taking *** per CIRA Ibarra for weight loss. Patient has lost *** lbs since last visit.   Patient's nutrition concerns include ***  Patient is eating *** times per day. ***  Patient {is/is not:54199} drinking at least 64 oz of fluid and sugar free beverages. Patient is typically drinking {drinkoptions:81136}.  Patient reports {exercise nb:10596} to remain active.   Discussed ***.    24-hr diet recall scanned into chart.    Vitals:   Vitals:    04/26/24 1057   BP: (!) 140/100   Site: Right Upper Arm   Position: Sitting   Cuff Size: Large Adult   Pulse: 70   Weight: (!) 215.9 kg (476 lb)   Height: 1.816 m (5' 11.5\")        Body mass index is 65.46 kg/m².    Estimated Energy Needs:  Calorie (MSJ xAF - 500): 2500 kcals  Protein: 60-80 grams protein  Fluids: minimum 64 oz fluid    Nutrition Diagnosis:  Overweight/Obesity related to complex combination of decreased energy needs, disordered eating patterns, physical inactivity, and increased psychological/life stress as evidenced by Body mass index is 65.46 kg/m²..    Nutrition Intervention:  Diet: Low-Fat Diet, 60-80gm of protein, and 64oz of fluid    Nutrition Education: Nutrition Care Manual    Goal:   Eat a variety of fruits and vegetables, lean protein, whole grains and low-fat dairy.   Sip on water or sugar-free fluid throughout the day.   Aim for 3 meals and 1-2 snacks daily.    Discuss activity with physician and determine a plan for remaining active.    Monitor/Evaluate:  Diet tolerance, protein intake, fluid intake, frequency of meals/snacks, activity, and follow-up monthly/prn.      Tara Huerta MS, RD, LD  Clinical Dietitian  [facility]  [phone]  
pounds weight gain - adipex will need to be stopped.   He is interested in the surgical route.  Will start today with referrals to pulmonology and cardiology since those appointments take the longest to get in to  Will have him meet with Dr. Castle to discuss surgery  Preop labs ordered - advised he doesn't need to get those completed until after he sees Dr. Castle  Information for our surgical online seminar given if he is interested in learning more on his own  Need for long term compliance and follow up stressed to patient  Follow up with PCP asap regarding leg swelling and htn - advised he would probably benefit from low dose hctz  to help with fluid retention and htn  Patient verbalized understanding of all instructions given.      Follow up: Return in about 20 days (around 5/16/2024) for with Dr. Castle - discuss surgery options.    Orders placed this encounter:   Orders Placed This Encounter   Procedures    CBC with Auto Differential    Comprehensive Metabolic Panel    Ferritin    Hemoglobin A1C    Magnesium    Lipid, Fasting    Iron and TIBC    PTH, Intact    Zinc    Vitamin D 25 Hydroxy    Vitamin B12 & Folate    Vitamin B1    Vitamin A    TSH    T4, Free    AFL - Doug Gomez MD, Pulmonology, [place]    AFL (Epic) - [facility], [place]       New Prescriptions:   No orders of the defined types were placed in this encounter.       Electronically signed by АННА Ibarra CNP on 4/26/2024 at 11:02 AM    Please note that this chart was generated using voice recognition Dragon dictation software.  Although every effort was made to ensure the accuracy of this automated transcription, some errors in transcription may have occurred.

## 2024-04-29 ENCOUNTER — TELEPHONE (OUTPATIENT)
Dept: INTERNAL MEDICINE CLINIC | Age: 34
End: 2024-04-29

## 2024-04-29 ENCOUNTER — OFFICE VISIT (OUTPATIENT)
Dept: FAMILY MEDICINE CLINIC | Age: 34
End: 2024-04-29
Payer: COMMERCIAL

## 2024-04-29 VITALS
TEMPERATURE: 97.2 F | SYSTOLIC BLOOD PRESSURE: 174 MMHG | WEIGHT: 315 LBS | BODY MASS INDEX: 42.66 KG/M2 | HEART RATE: 77 BPM | HEIGHT: 72 IN | OXYGEN SATURATION: 98 % | DIASTOLIC BLOOD PRESSURE: 84 MMHG

## 2024-04-29 DIAGNOSIS — I10 HYPERTENSION, UNSPECIFIED TYPE: ICD-10-CM

## 2024-04-29 DIAGNOSIS — E66.01 MORBID OBESITY (HCC): ICD-10-CM

## 2024-04-29 DIAGNOSIS — R60.0 BILATERAL EDEMA OF LOWER EXTREMITY: Primary | ICD-10-CM

## 2024-04-29 PROCEDURE — 3077F SYST BP >= 140 MM HG: CPT | Performed by: NURSE PRACTITIONER

## 2024-04-29 PROCEDURE — G8417 CALC BMI ABV UP PARAM F/U: HCPCS | Performed by: NURSE PRACTITIONER

## 2024-04-29 PROCEDURE — 3079F DIAST BP 80-89 MM HG: CPT | Performed by: NURSE PRACTITIONER

## 2024-04-29 PROCEDURE — 99214 OFFICE O/P EST MOD 30 MIN: CPT | Performed by: NURSE PRACTITIONER

## 2024-04-29 PROCEDURE — 1036F TOBACCO NON-USER: CPT | Performed by: NURSE PRACTITIONER

## 2024-04-29 PROCEDURE — G8427 DOCREV CUR MEDS BY ELIG CLIN: HCPCS | Performed by: NURSE PRACTITIONER

## 2024-04-29 RX ORDER — HYDROCHLOROTHIAZIDE 25 MG/1
25 TABLET ORAL EVERY MORNING
Qty: 14 TABLET | Refills: 0 | Status: SHIPPED | OUTPATIENT
Start: 2024-04-29 | End: 2024-05-13

## 2024-04-29 ASSESSMENT — ENCOUNTER SYMPTOMS
STRIDOR: 0
CHEST TIGHTNESS: 0
CHOKING: 0
SHORTNESS OF BREATH: 0
WHEEZING: 0
COUGH: 0

## 2024-04-29 NOTE — PROGRESS NOTES
no relief.       Past Medical History:   Diagnosis Date    ADHD (attention deficit hyperactivity disorder) 2000    IEP starting in 4th grade    Allergic rhinitis 1998    Anxiety 2019    Arthritis     Asthma 1998    Bipolar disorder ([facility]) 2023    Being looked into    Chronic pain of right knee     COVID     states he has Covid 5 times    Depression 2019    Gout     Hypertension First told in 2021    Obesity 2015    Sleep apnea 2021    uses CPAP    Substance abuse ([facility]) THC between 1549-2038    No longer smoke    Vitamin D deficiency         Current Outpatient Medications   Medication Sig Dispense Refill    hydroCHLOROthiazide (HYDRODIURIL) 25 MG tablet Take 1 tablet by mouth every morning for 14 days 14 tablet 0    albuterol sulfate HFA (PROVENTIL;VENTOLIN;PROAIR) 108 (90 Base) MCG/ACT inhaler Inhale 2 puffs into the lungs every 4 hours as needed for Wheezing 18 g 0    montelukast (SINGULAIR) 10 MG tablet Take 1 tablet by mouth nightly 30 tablet 2    albuterol (PROVENTIL) (2.5 MG/3ML) 0.083% nebulizer solution Take 3 mLs by nebulization every 4 hours as needed for Wheezing or Shortness of Breath 120 each 3    fluticasone (FLONASE) 50 MCG/ACT nasal spray 2 sprays by Each Nostril route daily 48 g 1    pantoprazole (PROTONIX) 40 MG tablet Take 1 tablet by mouth every morning (before breakfast) 30 tablet 5    busPIRone (BUSPAR) 10 MG tablet Take 1 tablet by mouth in the morning and 1 tablet in the evening.      vitamin D (ERGOCALCIFEROL) 1.25 MG (89564 UT) CAPS capsule Take 1 capsule by mouth once a week 12 capsule 0    guanFACINE (TENEX) 1 MG tablet Take 2 tablets by mouth every morning      hydrOXYzine HCl (ATARAX) 50 MG tablet Take 1 tablet by mouth every 8 hours as needed for Anxiety      ZOLOFT 50 MG tablet Take 2 tablets by mouth daily      sodium chloride (OCEAN) 0.65 % nasal spray 1 spray by Nasal route as needed for Congestion 1 each 0    olopatadine (PATANOL) 0.1 % ophthalmic solution Place 1 drop into both

## 2024-04-29 NOTE — TELEPHONE ENCOUNTER
Patient called with swelling in feet. No open appointments. [email or web address]

## 2024-05-16 ENCOUNTER — OFFICE VISIT (OUTPATIENT)
Dept: BARIATRICS/WEIGHT MGMT | Age: 34
End: 2024-05-16
Payer: COMMERCIAL

## 2024-05-16 VITALS
SYSTOLIC BLOOD PRESSURE: 150 MMHG | HEART RATE: 66 BPM | WEIGHT: 315 LBS | HEIGHT: 72 IN | BODY MASS INDEX: 42.66 KG/M2 | DIASTOLIC BLOOD PRESSURE: 110 MMHG | OXYGEN SATURATION: 98 %

## 2024-05-16 DIAGNOSIS — I10 ESSENTIAL HYPERTENSION: ICD-10-CM

## 2024-05-16 DIAGNOSIS — K31.84 GASTROPARESIS: Primary | ICD-10-CM

## 2024-05-16 DIAGNOSIS — E66.01 MORBID OBESITY WITH BMI OF 60.0-69.9, ADULT (HCC): ICD-10-CM

## 2024-05-16 DIAGNOSIS — G47.33 OSA ON CPAP: ICD-10-CM

## 2024-05-16 DIAGNOSIS — R06.09 DYSPNEA ON EXERTION: ICD-10-CM

## 2024-05-16 PROCEDURE — 3077F SYST BP >= 140 MM HG: CPT | Performed by: SURGERY

## 2024-05-16 PROCEDURE — 99214 OFFICE O/P EST MOD 30 MIN: CPT | Performed by: SURGERY

## 2024-05-16 PROCEDURE — G8417 CALC BMI ABV UP PARAM F/U: HCPCS | Performed by: SURGERY

## 2024-05-16 PROCEDURE — G8427 DOCREV CUR MEDS BY ELIG CLIN: HCPCS | Performed by: SURGERY

## 2024-05-16 PROCEDURE — 3080F DIAST BP >= 90 MM HG: CPT | Performed by: SURGERY

## 2024-05-16 PROCEDURE — 1036F TOBACCO NON-USER: CPT | Performed by: SURGERY

## 2024-05-16 RX ORDER — CHLORHEXIDINE GLUCONATE ORAL RINSE 1.2 MG/ML
15 SOLUTION DENTAL 2 TIMES DAILY
COMMUNITY
Start: 2024-05-08

## 2024-05-16 RX ORDER — AMOXICILLIN 250 MG/5ML
250 POWDER, FOR SUSPENSION ORAL 3 TIMES DAILY
COMMUNITY
Start: 2024-05-09

## 2024-05-16 NOTE — PROGRESS NOTES
[facility] INVASIVE BARIATRIC SURG  [address]  [address]  [place] [id number]  Dept: [phone]    SURGICAL WEIGHT MANAGEMENT PROGRAM  PROGRESS NOTE FOLLOW UP     Patient: Carlos Chu        Service Date: 5/16/2024     HPI:     Chief Complaint   Patient presents with    Bariatric, Initial Visit    Weight Loss     New surgical patient       The patient is a pleasant 33 y.o. year old male  with morbid obesity, who stands Height: 182.9 cm (6') tall with a weight of Weight - Scale: (!) 215.5 kg (475 lb) , resulting in a BMI of Body mass index is 64.42 kg/m².. The patient suffers from multiple co-morbidities as a result of morbid obesity, including: Hypertension, Obstructive Sleep Apnea treated with BiPAP/CPAP, GERD, Asthma, and Anxiety. He has suffered from obesity for many years.    The patient denies  a history of myocardial infarction, deep vein thrombosis, pulmonary embolism, renal failure, hepatic failure, and stroke.    The patient has failed multiple attempts at non-surgical weight loss, and is now seeking surgical intervention to promote permanent and consistent weight loss. He  has chosen Sleeve Gastrectomy.  He is well educated regarding it, as he has recently viewed our weight loss surgery informational seminar .     Medical History:  Past Medical History:   Diagnosis Date    ADHD (attention deficit hyperactivity disorder) 2000    IEP starting in 4th grade    Allergic rhinitis 1998    Anxiety 2019    Arthritis     Asthma 1998    Bipolar disorder (HCC) 2023    Being looked into    Chronic pain of right knee     COVID     states he has Covid 5 times    Depression 2019    Gout     Hypertension First told in 2021    Obesity 2015    Sleep apnea 2021    uses CPAP    Substance abuse (HCC) THC between 6698-5988    No longer smoke    Vitamin D deficiency        Surgical History:  Past Surgical History:   Procedure Laterality Date    TONSILLECTOMY

## 2024-05-17 ENCOUNTER — OFFICE VISIT (OUTPATIENT)
Age: 34
End: 2024-05-17
Payer: COMMERCIAL

## 2024-05-17 VITALS
HEART RATE: 94 BPM | SYSTOLIC BLOOD PRESSURE: 140 MMHG | DIASTOLIC BLOOD PRESSURE: 91 MMHG | WEIGHT: 315 LBS | OXYGEN SATURATION: 95 % | BODY MASS INDEX: 64.07 KG/M2

## 2024-05-17 DIAGNOSIS — I10 HYPERTENSION, UNSPECIFIED TYPE: Primary | ICD-10-CM

## 2024-05-17 PROCEDURE — 3077F SYST BP >= 140 MM HG: CPT | Performed by: INTERNAL MEDICINE

## 2024-05-17 PROCEDURE — 99204 OFFICE O/P NEW MOD 45 MIN: CPT | Performed by: INTERNAL MEDICINE

## 2024-05-17 PROCEDURE — 1036F TOBACCO NON-USER: CPT | Performed by: INTERNAL MEDICINE

## 2024-05-17 PROCEDURE — 93000 ELECTROCARDIOGRAM COMPLETE: CPT | Performed by: INTERNAL MEDICINE

## 2024-05-17 PROCEDURE — G8417 CALC BMI ABV UP PARAM F/U: HCPCS | Performed by: INTERNAL MEDICINE

## 2024-05-17 PROCEDURE — G8427 DOCREV CUR MEDS BY ELIG CLIN: HCPCS | Performed by: INTERNAL MEDICINE

## 2024-05-17 PROCEDURE — 3080F DIAST BP >= 90 MM HG: CPT | Performed by: INTERNAL MEDICINE

## 2024-05-17 RX ORDER — LOSARTAN POTASSIUM AND HYDROCHLOROTHIAZIDE 12.5; 5 MG/1; MG/1
1 TABLET ORAL DAILY
Qty: 30 TABLET | Refills: 3 | Status: SHIPPED | OUTPATIENT
Start: 2024-05-17

## 2024-05-17 NOTE — PROGRESS NOTES
Cardiology Office Consultation           CC: Patient is here to establish cardiac care for preop cardiac evaluation prior to bariatric surgery    HPI  Carlos Chu is here to establish cardiac care.  He has morbid obesity and hypertension.  He is currently being evaluated for sleeve gastrectomy per Dr. Castle.  He has no prior history of CAD/CHF/CVA/TIA/insulin-dependent diabetes mellitus or renal insufficiency.  He denies any chest pain or dyspnea on routine activities.  His functional capacity is fair without any recent decline.      Past Medical:  Past Medical History:   Diagnosis Date    ADHD (attention deficit hyperactivity disorder) 2000    IEP starting in 4th grade    Allergic rhinitis 1998    Anxiety 2019    Arthritis     Asthma 1998    Bipolar disorder (HCC) 2023    Being looked into    Chronic pain of right knee     COVID     states he has Covid 5 times    Depression 2019    Gout     Hypertension First told in 2021    Obesity 2015    Sleep apnea 2021    uses CPAP    Substance abuse (HCC) THC between 5916-3415    No longer smoke    Vitamin D deficiency        Past Surgical:  Past Surgical History:   Procedure Laterality Date    TONSILLECTOMY      UPPER GASTROINTESTINAL ENDOSCOPY N/A 2/29/2024    ESOPHAGOGASTRODUODENOSCOPY BIOPSY performed by Alannah Gonsalez MD at [facility] ENDO    WISDOM TOOTH EXTRACTION         Family History:  Family History   Problem Relation Age of Onset    Allergy (Severe) Mother     Asthma Mother     High Blood Pressure Mother     Diabetes Father     Allergy (Severe) Sister     Asthma Sister     Allergy (Severe) Brother     Asthma Brother     Rheum Arthritis Maternal Grandmother     Colon Cancer Maternal Grandmother     Thyroid Cancer Maternal Grandmother     Heart Attack Maternal Grandmother        Social History:  Social History     Tobacco Use    Smoking status: Never    Smokeless tobacco: Never   Vaping Use    Vaping Use: Never used   Substance Use Topics    Alcohol use: No

## 2024-06-19 DIAGNOSIS — J45.20 MILD INTERMITTENT ASTHMA WITHOUT COMPLICATION: ICD-10-CM

## 2024-06-20 RX ORDER — ALBUTEROL SULFATE 90 UG/1
2 AEROSOL, METERED RESPIRATORY (INHALATION) EVERY 4 HOURS PRN
Qty: 18 G | Refills: 0 | Status: SHIPPED | OUTPATIENT
Start: 2024-06-20

## 2024-07-19 DIAGNOSIS — J45.20 MILD INTERMITTENT ASTHMA WITHOUT COMPLICATION: ICD-10-CM

## 2024-07-22 RX ORDER — ALBUTEROL SULFATE 90 UG/1
2 AEROSOL, METERED RESPIRATORY (INHALATION) EVERY 4 HOURS PRN
Qty: 18 G | Refills: 0 | Status: SHIPPED | OUTPATIENT
Start: 2024-07-22

## 2024-08-02 RX ORDER — PANTOPRAZOLE SODIUM 40 MG/1
40 TABLET, DELAYED RELEASE ORAL
Qty: 30 TABLET | Refills: 0 | Status: SHIPPED | OUTPATIENT
Start: 2024-08-02

## 2024-09-05 ENCOUNTER — HOSPITAL ENCOUNTER (OUTPATIENT)
Age: 34
Setting detail: SPECIMEN
Discharge: HOME | End: 2024-09-05

## 2024-09-05 ENCOUNTER — OFFICE VISIT (OUTPATIENT)
Dept: DERMATOLOGY | Age: 34
End: 2024-09-05
Payer: COMMERCIAL

## 2024-09-05 VITALS
WEIGHT: 315 LBS | TEMPERATURE: 98.1 F | HEART RATE: 84 BPM | HEIGHT: 72 IN | SYSTOLIC BLOOD PRESSURE: 139 MMHG | BODY MASS INDEX: 42.66 KG/M2 | DIASTOLIC BLOOD PRESSURE: 86 MMHG | OXYGEN SATURATION: 98 %

## 2024-09-05 DIAGNOSIS — J30.2 SEASONAL ALLERGIC RHINITIS, UNSPECIFIED TRIGGER: ICD-10-CM

## 2024-09-05 DIAGNOSIS — L72.0 EPIDERMOID CYST: ICD-10-CM

## 2024-09-05 DIAGNOSIS — L73.9 FOLLICULITIS: ICD-10-CM

## 2024-09-05 DIAGNOSIS — J45.20 MILD INTERMITTENT ASTHMA WITHOUT COMPLICATION: ICD-10-CM

## 2024-09-05 DIAGNOSIS — L73.9 FOLLICULITIS: Primary | ICD-10-CM

## 2024-09-05 PROCEDURE — G8427 DOCREV CUR MEDS BY ELIG CLIN: HCPCS | Performed by: PHYSICIAN ASSISTANT

## 2024-09-05 PROCEDURE — G8417 CALC BMI ABV UP PARAM F/U: HCPCS | Performed by: PHYSICIAN ASSISTANT

## 2024-09-05 PROCEDURE — 3075F SYST BP GE 130 - 139MM HG: CPT | Performed by: PHYSICIAN ASSISTANT

## 2024-09-05 PROCEDURE — 3079F DIAST BP 80-89 MM HG: CPT | Performed by: PHYSICIAN ASSISTANT

## 2024-09-05 PROCEDURE — 99203 OFFICE O/P NEW LOW 30 MIN: CPT | Performed by: PHYSICIAN ASSISTANT

## 2024-09-05 PROCEDURE — 1036F TOBACCO NON-USER: CPT | Performed by: PHYSICIAN ASSISTANT

## 2024-09-05 RX ORDER — FLUTICASONE PROPIONATE 50 MCG
SPRAY, SUSPENSION (ML) NASAL
Qty: 48 G | Refills: 2 | Status: SHIPPED | OUTPATIENT
Start: 2024-09-05

## 2024-09-05 RX ORDER — DOXYCYCLINE 100 MG/1
CAPSULE ORAL
Qty: 20 CAPSULE | Refills: 0 | Status: SHIPPED | OUTPATIENT
Start: 2024-09-05

## 2024-09-05 RX ORDER — CLINDAMYCIN PHOSPHATE 10 UG/ML
LOTION TOPICAL
Qty: 120 ML | Refills: 2 | Status: SHIPPED | OUTPATIENT
Start: 2024-09-05

## 2024-09-05 RX ORDER — ALBUTEROL SULFATE 90 UG/1
2 AEROSOL, METERED RESPIRATORY (INHALATION) EVERY 4 HOURS PRN
Qty: 18 G | Refills: 2 | Status: SHIPPED | OUTPATIENT
Start: 2024-09-05

## 2024-09-05 RX ORDER — MONTELUKAST SODIUM 10 MG/1
10 TABLET ORAL NIGHTLY
Qty: 90 TABLET | Refills: 1 | Status: SHIPPED | OUTPATIENT
Start: 2024-09-05

## 2024-09-05 NOTE — PROGRESS NOTES
Dermatology Patient Note  [facility] DERMATOLOGY  [address]  [address]  [facility] [place] [facility] [address]  Dept: [phone]  Dept Fax: [phone]      VISITDATE: 9/5/2024   REFERRING PROVIDER: Gretchen Luciano APRN - *      Carlos Chu is a 34 y.o. male  who presents today in the office for:    New Patient (New patient presents today for a bump on his left cheek that will swell with blood and pus. It comes and goes and has gotten bigger over the years. Also he has red itchy scabs his legs that started two months ago. He applies cortisone cream with little improvement. )      HISTORY OF PRESENT ILLNESS:  As above.  Dermatitis on arms, legs, and upper back is pruritic at times.    MEDICAL PROBLEMS:  Patient Active Problem List    Diagnosis Date Noted    Insomnia 07/28/2022     Priority: Medium    Dyspnea, unspecified 09/30/2021     Priority: Medium    Allergic rhinitis 12/05/2023    ADHD (attention deficit hyperactivity disorder) 12/05/2023    Sleep apnea in adult 12/05/2023    Hypertension 12/05/2023    Anxiety 02/03/2022    Morbid obesity (HCC) 10/14/2021       CURRENT MEDICATIONS:   Current Outpatient Medications   Medication Sig Dispense Refill    benzoyl peroxide 5 % external liquid Wash affected areas once daily 227 g 5    clindamycin (CLEOCIN T) 1 % lotion Apply to affected areas daily 120 mL 2    doxycycline hyclate (VIBRAMYCIN) 100 MG capsule Take 1 pill twice daily with food 20 capsule 0    pantoprazole (PROTONIX) 40 MG tablet Take 1 tablet by mouth every morning (before breakfast) 30 tablet 0    albuterol sulfate HFA (PROVENTIL;VENTOLIN;PROAIR) 108 (90 Base) MCG/ACT inhaler INHALE 2 PUFFS BY MOUTH EVERY 4 HOURS AS NEEDED FOR WHEEZING 18 g 0    losartan-hydroCHLOROthiazide (HYZAAR) 50-12.5 MG per tablet Take 1 tablet by mouth daily 30 tablet 3    chlorhexidine (PERIDEX) 0.12 % solution Swish and spit 15 mLs 2 times daily      montelukast

## 2024-09-07 LAB
MICROORGANISM SPEC CULT: NORMAL
MICROORGANISM/AGENT SPEC: NORMAL
MICROORGANISM/AGENT SPEC: NORMAL
SPECIMEN DESCRIPTION: NORMAL

## 2024-09-09 RX ORDER — PANTOPRAZOLE SODIUM 40 MG/1
40 TABLET, DELAYED RELEASE ORAL
Qty: 30 TABLET | Refills: 0 | Status: SHIPPED | OUTPATIENT
Start: 2024-09-09

## 2024-09-19 ENCOUNTER — TELEPHONE (OUTPATIENT)
Dept: DERMATOLOGY | Age: 34
End: 2024-09-19

## 2024-09-20 RX ORDER — LOSARTAN POTASSIUM AND HYDROCHLOROTHIAZIDE 12.5; 5 MG/1; MG/1
1 TABLET ORAL DAILY
Qty: 90 TABLET | Refills: 3 | Status: SHIPPED | OUTPATIENT
Start: 2024-09-20

## 2024-09-30 ENCOUNTER — HOSPITAL ENCOUNTER (OUTPATIENT)
Dept: NUCLEAR MEDICINE | Age: 34
Discharge: HOME | End: 2024-10-02
Payer: COMMERCIAL

## 2024-09-30 DIAGNOSIS — R10.13 EPIGASTRIC PAIN: ICD-10-CM

## 2024-09-30 DIAGNOSIS — K21.9 GASTROESOPHAGEAL REFLUX DISEASE, UNSPECIFIED WHETHER ESOPHAGITIS PRESENT: ICD-10-CM

## 2024-09-30 DIAGNOSIS — R09.A2 GLOBUS SENSATION: ICD-10-CM

## 2024-09-30 DIAGNOSIS — R11.0 NAUSEA: ICD-10-CM

## 2024-09-30 DIAGNOSIS — R13.19 ESOPHAGEAL DYSPHAGIA: ICD-10-CM

## 2024-09-30 DIAGNOSIS — K30 DELAYED GASTRIC EMPTYING: ICD-10-CM

## 2024-09-30 PROCEDURE — 78264 GASTRIC EMPTYING IMG STUDY: CPT

## 2024-09-30 PROCEDURE — A9541 TC99M SULFUR COLLOID: HCPCS | Performed by: PHYSICIAN ASSISTANT

## 2024-09-30 PROCEDURE — 3430000000 HC RX DIAGNOSTIC RADIOPHARMACEUTICAL: Performed by: PHYSICIAN ASSISTANT

## 2024-09-30 RX ORDER — TECHNETIUM TC 99M SULFUR COLLOID 2 MG
1 KIT MISCELLANEOUS ONCE
Status: COMPLETED | OUTPATIENT
Start: 2024-09-30 | End: 2024-09-30

## 2024-09-30 RX ADMIN — TECHNETIUM TC 99M SULFUR COLLOID 1 MILLICURIE: KIT at 10:23

## 2024-10-01 ENCOUNTER — TELEPHONE (OUTPATIENT)
Dept: GASTROENTEROLOGY | Age: 34
End: 2024-10-01

## 2024-10-01 NOTE — TELEPHONE ENCOUNTER
----- Message from EULA Grubbs sent at 10/1/2024 10:22 AM EDT -----  Slightly delayed liquid gastric emptying. Pt should avoid drinking large quantities of liquids at one time since this is what he has difficulty processing. Small portions and 4 meals per day may also help reduce sx, will follow up in-person and discuss any further changes in 1 month

## 2024-10-01 NOTE — TELEPHONE ENCOUNTER
Writer spoke to pt directly regarding gastric emptying study. Pt understands. Pt is scheduled for follow up in office in 10-3-24. Pt is aware. Call ends

## 2024-10-03 ENCOUNTER — OFFICE VISIT (OUTPATIENT)
Dept: GASTROENTEROLOGY | Age: 34
End: 2024-10-03
Payer: COMMERCIAL

## 2024-10-03 VITALS
HEART RATE: 78 BPM | OXYGEN SATURATION: 95 % | BODY MASS INDEX: 64.69 KG/M2 | DIASTOLIC BLOOD PRESSURE: 81 MMHG | SYSTOLIC BLOOD PRESSURE: 136 MMHG | WEIGHT: 315 LBS | RESPIRATION RATE: 16 BRPM | TEMPERATURE: 97.4 F

## 2024-10-03 DIAGNOSIS — K30 DELAYED GASTRIC EMPTYING: Primary | ICD-10-CM

## 2024-10-03 DIAGNOSIS — K21.9 GASTROESOPHAGEAL REFLUX DISEASE, UNSPECIFIED WHETHER ESOPHAGITIS PRESENT: ICD-10-CM

## 2024-10-03 DIAGNOSIS — R10.13 EPIGASTRIC PAIN: ICD-10-CM

## 2024-10-03 DIAGNOSIS — R09.A2 GLOBUS SENSATION: ICD-10-CM

## 2024-10-03 PROCEDURE — G8484 FLU IMMUNIZE NO ADMIN: HCPCS | Performed by: INTERNAL MEDICINE

## 2024-10-03 PROCEDURE — 1036F TOBACCO NON-USER: CPT | Performed by: INTERNAL MEDICINE

## 2024-10-03 PROCEDURE — 99214 OFFICE O/P EST MOD 30 MIN: CPT | Performed by: PHYSICIAN ASSISTANT

## 2024-10-03 PROCEDURE — G8417 CALC BMI ABV UP PARAM F/U: HCPCS | Performed by: INTERNAL MEDICINE

## 2024-10-03 PROCEDURE — G8427 DOCREV CUR MEDS BY ELIG CLIN: HCPCS | Performed by: INTERNAL MEDICINE

## 2024-10-03 PROCEDURE — 3075F SYST BP GE 130 - 139MM HG: CPT | Performed by: PHYSICIAN ASSISTANT

## 2024-10-03 PROCEDURE — 3079F DIAST BP 80-89 MM HG: CPT | Performed by: PHYSICIAN ASSISTANT

## 2024-10-03 RX ORDER — METOCLOPRAMIDE 10 MG/1
10 TABLET ORAL
Qty: 120 TABLET | Refills: 2 | Status: SHIPPED | OUTPATIENT
Start: 2024-10-03

## 2024-10-03 ASSESSMENT — ENCOUNTER SYMPTOMS
CONSTIPATION: 0
RECTAL PAIN: 0
NAUSEA: 0
COUGH: 0
DIARRHEA: 0
WHEEZING: 0
ABDOMINAL DISTENTION: 0
ABDOMINAL PAIN: 0
SORE THROAT: 0
VOMITING: 0
CHOKING: 0
BLOOD IN STOOL: 0
ANAL BLEEDING: 0
VOICE CHANGE: 0
TROUBLE SWALLOWING: 0

## 2024-10-03 NOTE — PROGRESS NOTES
GI CLINIC FOLLOW UP    INTERVAL HISTORY:   No referring provider defined for this encounter.    Chief Complaint   Patient presents with    Follow-up     6 month follow up with Gastric emptying study done        HISTORY OF PRESENT ILLNESS: Mr.Corbin SAMEERA Chu is a 34 y.o. male , referred for evaluation of GERD, dysphagia, epigastric pain, nausea.     Here for f/u.  EGD 2/29/24 showing food in the stomach for which GES was ordered  Completed 9/30/24 showing some delayed gastric emptying of liquids.     Today he reports significant bloating, occasional heartburn. No nausea, vomiting. Globus sensation continues to be intermittent. Still on PPI. Has never taken motility agents.  Having regular bowel movements, daily, without tenesmus. No melena, hematochezia    He does take doxycycline for acne from a dermatologist    He is following with bariatrics - last labs including CBC, LFTs, TSH, and A1c are normal. They stopped his Adipex. Lots of labs pending. Will be meeting with dietician. No known history of diabetes.     No history of diabetes  No etoh, tobacco, marijuana use  No NSAIDs       Labs : normal CBC /LFT 12/23  Past Medical,Family, and Social History reviewed and does contribute to the patient presentingcondition.    I did review all the labs results available for the labs which were ordered by the primary care physician, and the other consultants, we search on [employer] at [facility] and all the available care everywhere epic    I did review all the imaging studies of the abdomen available on EMR, ordered by the primary care physician and the other consultant    I did review all the pathology from the biopsies done on the previous endoscopies    Patient's PMH/PSH,SH,PSYCH Hx, MEDs, ALLERGIES, and ROS were all reviewed and updated in the appropriate sections.    PAST MEDICAL HISTORY:  Past Medical History:   Diagnosis Date    ADHD (attention deficit hyperactivity disorder) 2000    IEP starting in 4th grade

## 2024-10-07 RX ORDER — PANTOPRAZOLE SODIUM 40 MG/1
40 TABLET, DELAYED RELEASE ORAL
Qty: 30 TABLET | Refills: 0 | Status: SHIPPED | OUTPATIENT
Start: 2024-10-07

## 2024-10-10 ENCOUNTER — OFFICE VISIT (OUTPATIENT)
Dept: INTERNAL MEDICINE CLINIC | Age: 34
End: 2024-10-10
Payer: COMMERCIAL

## 2024-10-10 VITALS
DIASTOLIC BLOOD PRESSURE: 82 MMHG | SYSTOLIC BLOOD PRESSURE: 132 MMHG | HEART RATE: 79 BPM | BODY MASS INDEX: 42.66 KG/M2 | OXYGEN SATURATION: 98 % | WEIGHT: 315 LBS | HEIGHT: 72 IN

## 2024-10-10 DIAGNOSIS — J45.30 MILD PERSISTENT ASTHMA, UNSPECIFIED WHETHER COMPLICATED: Primary | ICD-10-CM

## 2024-10-10 DIAGNOSIS — J30.2 SEASONAL ALLERGIC RHINITIS, UNSPECIFIED TRIGGER: ICD-10-CM

## 2024-10-10 DIAGNOSIS — E66.01 MORBID OBESITY WITH BMI OF 60.0-69.9, ADULT: ICD-10-CM

## 2024-10-10 DIAGNOSIS — Z23 NEED FOR PNEUMOCOCCAL VACCINE: ICD-10-CM

## 2024-10-10 DIAGNOSIS — L84 CALLUS OF FOOT: ICD-10-CM

## 2024-10-10 PROCEDURE — G8427 DOCREV CUR MEDS BY ELIG CLIN: HCPCS | Performed by: NURSE PRACTITIONER

## 2024-10-10 PROCEDURE — 3075F SYST BP GE 130 - 139MM HG: CPT | Performed by: NURSE PRACTITIONER

## 2024-10-10 PROCEDURE — 99214 OFFICE O/P EST MOD 30 MIN: CPT | Performed by: NURSE PRACTITIONER

## 2024-10-10 PROCEDURE — 3079F DIAST BP 80-89 MM HG: CPT | Performed by: NURSE PRACTITIONER

## 2024-10-10 PROCEDURE — 1036F TOBACCO NON-USER: CPT | Performed by: NURSE PRACTITIONER

## 2024-10-10 PROCEDURE — G8484 FLU IMMUNIZE NO ADMIN: HCPCS | Performed by: NURSE PRACTITIONER

## 2024-10-10 PROCEDURE — 90677 PCV20 VACCINE IM: CPT | Performed by: NURSE PRACTITIONER

## 2024-10-10 PROCEDURE — G8417 CALC BMI ABV UP PARAM F/U: HCPCS | Performed by: NURSE PRACTITIONER

## 2024-10-10 RX ORDER — GUANFACINE 2 MG/1
1 TABLET ORAL EVERY MORNING
COMMUNITY
Start: 2024-10-04

## 2024-10-10 RX ORDER — ALBUTEROL SULFATE 0.83 MG/ML
2.5 SOLUTION RESPIRATORY (INHALATION) EVERY 4 HOURS PRN
Qty: 120 EACH | Refills: 3 | Status: SHIPPED | OUTPATIENT
Start: 2024-10-10

## 2024-10-10 RX ORDER — ALBUTEROL SULFATE 90 UG/1
2 INHALANT RESPIRATORY (INHALATION) EVERY 4 HOURS PRN
Qty: 18 G | Refills: 2 | Status: SHIPPED | OUTPATIENT
Start: 2024-10-10

## 2024-10-10 RX ORDER — FLUTICASONE PROPIONATE 50 MCG
2 SPRAY, SUSPENSION (ML) NASAL DAILY
Qty: 48 G | Refills: 1 | Status: SHIPPED | OUTPATIENT
Start: 2024-10-10

## 2024-10-10 RX ORDER — MONTELUKAST SODIUM 10 MG/1
10 TABLET ORAL NIGHTLY
Qty: 90 TABLET | Refills: 1 | Status: SHIPPED | OUTPATIENT
Start: 2024-10-10

## 2024-10-10 RX ORDER — TIOTROPIUM BROMIDE INHALATION SPRAY 1.56 UG/1
2 SPRAY, METERED RESPIRATORY (INHALATION) DAILY
Qty: 3 EACH | Refills: 1 | Status: SHIPPED | OUTPATIENT
Start: 2024-10-10

## 2024-10-11 ENCOUNTER — HOSPITAL ENCOUNTER (OUTPATIENT)
Age: 34
Discharge: HOME | End: 2024-10-13
Payer: COMMERCIAL

## 2024-10-11 ENCOUNTER — HOSPITAL ENCOUNTER (OUTPATIENT)
Age: 34
Setting detail: SPECIMEN
Discharge: HOME | End: 2024-10-11
Payer: COMMERCIAL

## 2024-10-11 ENCOUNTER — HOSPITAL ENCOUNTER (OUTPATIENT)
Dept: PULMONOLOGY | Age: 34
Discharge: HOME | End: 2024-10-11
Attending: SURGERY
Payer: COMMERCIAL

## 2024-10-11 ENCOUNTER — HOSPITAL ENCOUNTER (OUTPATIENT)
Dept: GENERAL RADIOLOGY | Age: 34
Discharge: HOME | End: 2024-10-13
Attending: SURGERY
Payer: COMMERCIAL

## 2024-10-11 DIAGNOSIS — I10 HYPERTENSION, UNSPECIFIED TYPE: ICD-10-CM

## 2024-10-11 DIAGNOSIS — R06.09 DYSPNEA ON EXERTION: ICD-10-CM

## 2024-10-11 DIAGNOSIS — G47.33 OSA ON CPAP: ICD-10-CM

## 2024-10-11 DIAGNOSIS — E66.01 CLASS 3 SEVERE OBESITY DUE TO EXCESS CALORIES WITH SERIOUS COMORBIDITY AND BODY MASS INDEX (BMI) OF 60.0 TO 69.9 IN ADULT: ICD-10-CM

## 2024-10-11 DIAGNOSIS — E55.9 VITAMIN D DEFICIENCY: ICD-10-CM

## 2024-10-11 DIAGNOSIS — E78.9 BORDERLINE HIGH CHOLESTEROL: ICD-10-CM

## 2024-10-11 DIAGNOSIS — E66.813 CLASS 3 SEVERE OBESITY DUE TO EXCESS CALORIES WITH SERIOUS COMORBIDITY AND BODY MASS INDEX (BMI) OF 60.0 TO 69.9 IN ADULT: ICD-10-CM

## 2024-10-11 DIAGNOSIS — R73.9 ELEVATED BLOOD SUGAR: ICD-10-CM

## 2024-10-11 LAB
25(OH)D3 SERPL-MCNC: 14.2 NG/ML (ref 30–100)
ALBUMIN SERPL-MCNC: 4.3 G/DL (ref 3.5–5.2)
ALP SERPL-CCNC: 104 U/L (ref 40–129)
ALT SERPL-CCNC: 22 U/L (ref 10–50)
ANION GAP SERPL CALCULATED.3IONS-SCNC: 8 MMOL/L (ref 9–16)
AST SERPL-CCNC: 25 U/L (ref 10–50)
BASOPHILS # BLD: 0.1 K/UL (ref 0–0.2)
BASOPHILS NFR BLD: 1 % (ref 0–2)
BILIRUB SERPL-MCNC: 0.4 MG/DL (ref 0–1.2)
BUN SERPL-MCNC: 18 MG/DL (ref 6–20)
CALCIUM SERPL-MCNC: 9.4 MG/DL (ref 8.6–10.4)
CHLORIDE SERPL-SCNC: 104 MMOL/L (ref 98–107)
CHOLEST SERPL-MCNC: 150 MG/DL (ref 0–199)
CO2 SERPL-SCNC: 28 MMOL/L (ref 20–31)
CREAT SERPL-MCNC: 1 MG/DL (ref 0.7–1.2)
DLCO %PRED: NORMAL
DLCO PRED: NORMAL
DLCO/VA %PRED: NORMAL
DLCO/VA PRED: NORMAL
DLCO/VA: NORMAL
DLCO: NORMAL
EOSINOPHIL # BLD: 0.1 K/UL (ref 0–0.4)
EOSINOPHILS RELATIVE PERCENT: 1 % (ref 0–4)
ERYTHROCYTE [DISTWIDTH] IN BLOOD BY AUTOMATED COUNT: 13.3 % (ref 11.5–14.9)
EST. AVERAGE GLUCOSE BLD GHB EST-MCNC: 94 MG/DL
EXPIRATORY TIME: NORMAL
FEF 25-75% %PRED-PRE: NORMAL
FEF 25-75% PRED: NORMAL
FEF 25-75-PRE: NORMAL
FERRITIN SERPL-MCNC: 271 NG/ML (ref 30–400)
FEV1 %PRED-PRE: NORMAL
FEV1 PRED: NORMAL
FEV1/FVC %PRED-PRE: NORMAL
FEV1/FVC PRED: NORMAL
FEV1/FVC: NORMAL
FEV1: NORMAL
FOLATE SERPL-MCNC: 10.4 NG/ML (ref 4.8–24.2)
FVC %PRED-PRE: NORMAL
FVC PRED: NORMAL
FVC: NORMAL
GAW %PRED: NORMAL
GAW PRED: NORMAL
GAW: NORMAL
GFR, ESTIMATED: >90 ML/MIN/1.73M2
GLUCOSE SERPL-MCNC: 107 MG/DL (ref 74–99)
HBA1C MFR BLD: 4.9 % (ref 4–6)
HCT VFR BLD AUTO: 43 % (ref 41–53)
HDLC SERPL-MCNC: 26 MG/DL
HGB BLD-MCNC: 14.9 G/DL (ref 13.5–17.5)
IC PRE %PRED: NORMAL
IC PRED: NORMAL
IC: NORMAL
IRON SATN MFR SERPL: 15 % (ref 20–55)
IRON SERPL-MCNC: 38 UG/DL (ref 61–157)
LDLC SERPL CALC-MCNC: 91 MG/DL (ref 0–100)
LYMPHOCYTES NFR BLD: 1.3 K/UL (ref 1–4.8)
LYMPHOCYTES RELATIVE PERCENT: 14 % (ref 24–44)
MAGNESIUM SERPL-MCNC: 1.9 MG/DL (ref 1.6–2.6)
MCH RBC QN AUTO: 29.7 PG (ref 26–34)
MCHC RBC AUTO-ENTMCNC: 34.7 G/DL (ref 31–37)
MCV RBC AUTO: 85.5 FL (ref 80–100)
MONOCYTES NFR BLD: 0.7 K/UL (ref 0.1–1.3)
MONOCYTES NFR BLD: 7 % (ref 1–7)
MVV %PRED-PRE: NORMAL
MVV PRED: NORMAL
MVV-PRE: NORMAL
NEUTROPHILS NFR BLD: 77 % (ref 36–66)
NEUTS SEG NFR BLD: 7.3 K/UL (ref 1.3–9.1)
PEF %PRED-PRE: NORMAL
PEF PRED: NORMAL
PEF-PRE: NORMAL
PLATELET # BLD AUTO: 233 K/UL (ref 150–450)
PMV BLD AUTO: 7.2 FL (ref 6–12)
POTASSIUM SERPL-SCNC: 4.2 MMOL/L (ref 3.7–5.3)
PROT SERPL-MCNC: 7.3 G/DL (ref 6.6–8.7)
PTH-INTACT SERPL-MCNC: 26 PG/ML (ref 15–65)
RAW %PRED: NORMAL
RAW PRED: NORMAL
RAW: NORMAL
RBC # BLD AUTO: 5.02 M/UL (ref 4.5–5.9)
RV PRE %PRED: NORMAL
RV PRED: NORMAL
RV: NORMAL
SODIUM SERPL-SCNC: 140 MMOL/L (ref 136–145)
SVC %PRED: NORMAL
SVC PRED: NORMAL
SVC: NORMAL
T4 FREE SERPL-MCNC: 1.1 NG/DL (ref 0.9–1.7)
TIBC SERPL-MCNC: 249 UG/DL (ref 250–450)
TLC PRE %PRED: NORMAL
TLC PRED: NORMAL
TLC: NORMAL
TRIGL SERPL-MCNC: 166 MG/DL (ref 0–149)
TSH SERPL DL<=0.05 MIU/L-ACNC: 1.55 UIU/ML (ref 0.27–4.2)
UNSATURATED IRON BINDING CAPACITY: 211 UG/DL (ref 112–347)
VA %PRED: NORMAL
VA PRED: NORMAL
VA: NORMAL
VIT B12 SERPL-MCNC: 402 PG/ML (ref 232–1245)
VTG %PRED: NORMAL
VTG PRED: NORMAL
VTG: NORMAL
WBC OTHER # BLD: 9.4 K/UL (ref 3.5–11)

## 2024-10-11 PROCEDURE — 83550 IRON BINDING TEST: CPT

## 2024-10-11 PROCEDURE — 82728 ASSAY OF FERRITIN: CPT

## 2024-10-11 PROCEDURE — 83735 ASSAY OF MAGNESIUM: CPT

## 2024-10-11 PROCEDURE — 94729 DIFFUSING CAPACITY: CPT

## 2024-10-11 PROCEDURE — 82306 VITAMIN D 25 HYDROXY: CPT

## 2024-10-11 PROCEDURE — 83970 ASSAY OF PARATHORMONE: CPT

## 2024-10-11 PROCEDURE — 6370000000 HC RX 637 (ALT 250 FOR IP): Performed by: SURGERY

## 2024-10-11 PROCEDURE — 83036 HEMOGLOBIN GLYCOSYLATED A1C: CPT

## 2024-10-11 PROCEDURE — 82607 VITAMIN B-12: CPT

## 2024-10-11 PROCEDURE — 84630 ASSAY OF ZINC: CPT

## 2024-10-11 PROCEDURE — 94060 EVALUATION OF WHEEZING: CPT

## 2024-10-11 PROCEDURE — 36415 COLL VENOUS BLD VENIPUNCTURE: CPT

## 2024-10-11 PROCEDURE — 80053 COMPREHEN METABOLIC PANEL: CPT

## 2024-10-11 PROCEDURE — 83540 ASSAY OF IRON: CPT

## 2024-10-11 PROCEDURE — 84590 ASSAY OF VITAMIN A: CPT

## 2024-10-11 PROCEDURE — 84439 ASSAY OF FREE THYROXINE: CPT

## 2024-10-11 PROCEDURE — 85025 COMPLETE CBC W/AUTO DIFF WBC: CPT

## 2024-10-11 PROCEDURE — 94664 DEMO&/EVAL PT USE INHALER: CPT

## 2024-10-11 PROCEDURE — 82746 ASSAY OF FOLIC ACID SERUM: CPT

## 2024-10-11 PROCEDURE — 94726 PLETHYSMOGRAPHY LUNG VOLUMES: CPT

## 2024-10-11 PROCEDURE — 84443 ASSAY THYROID STIM HORMONE: CPT

## 2024-10-11 PROCEDURE — 84425 ASSAY OF VITAMIN B-1: CPT

## 2024-10-11 PROCEDURE — 80061 LIPID PANEL: CPT

## 2024-10-11 PROCEDURE — 71046 X-RAY EXAM CHEST 2 VIEWS: CPT

## 2024-10-11 RX ORDER — ALBUTEROL SULFATE 90 UG/1
2 INHALANT RESPIRATORY (INHALATION) ONCE
Status: COMPLETED | OUTPATIENT
Start: 2024-10-11 | End: 2024-10-11

## 2024-10-11 RX ADMIN — ALBUTEROL SULFATE 2 PUFF: 90 AEROSOL, METERED RESPIRATORY (INHALATION) at 10:21

## 2024-10-11 NOTE — PROCEDURES
PROCEDURE NOTE  Date: 10/11/2024   Name: Carlos Chu  YOB: 1990    Procedures                PFT Interpretation:    Obstructive Ventilatory defect of MODERATE severity is noted.  Evidence of air trapping Is noted.  Diffusion Capacity is normal.   NO Significant improvement is noted lung mechanics after bronchodilators.      Julieta Gomez MD

## 2024-10-13 LAB — ZINC SERPL-MCNC: 84.4 UG/DL (ref 60–120)

## 2024-10-14 LAB
RETINYL PALMITATE: 0.02 MG/L (ref 0–0.1)
VITAMIN A LEVEL: 0.47 MG/L (ref 0.3–1.2)
VITAMIN A, INTERP: NORMAL

## 2024-10-15 LAB — VIT B1 PYROPHOSHATE BLD-SCNC: 140 NMOL/L (ref 70–180)

## 2024-10-17 RX ORDER — ERGOCALCIFEROL 1.25 MG/1
50000 CAPSULE, LIQUID FILLED ORAL WEEKLY
Qty: 12 CAPSULE | Refills: 0 | Status: SHIPPED | OUTPATIENT
Start: 2024-10-17

## 2024-10-17 RX ORDER — FERROUS SULFATE 325(65) MG
325 TABLET ORAL
Qty: 30 TABLET | Refills: 1 | Status: SHIPPED | OUTPATIENT
Start: 2024-10-17

## 2024-10-21 ASSESSMENT — ENCOUNTER SYMPTOMS
ABDOMINAL PAIN: 0
WHEEZING: 0
CONSTIPATION: 0
COLOR CHANGE: 1
RHINORRHEA: 0
COUGH: 0
DIARRHEA: 0
SORE THROAT: 0

## 2024-11-04 ENCOUNTER — OFFICE VISIT (OUTPATIENT)
Dept: PODIATRY | Age: 34
End: 2024-11-04
Payer: COMMERCIAL

## 2024-11-04 VITALS — WEIGHT: 315 LBS | BODY MASS INDEX: 42.66 KG/M2 | HEIGHT: 72 IN

## 2024-11-04 DIAGNOSIS — M79.672 PAIN IN LEFT FOOT: ICD-10-CM

## 2024-11-04 DIAGNOSIS — M79.671 PAIN IN RIGHT FOOT: ICD-10-CM

## 2024-11-04 DIAGNOSIS — L85.3 XEROSIS CUTIS: ICD-10-CM

## 2024-11-04 DIAGNOSIS — L84 CORNS AND CALLOSITIES: Primary | ICD-10-CM

## 2024-11-04 PROCEDURE — 1036F TOBACCO NON-USER: CPT | Performed by: PODIATRIST

## 2024-11-04 PROCEDURE — G8484 FLU IMMUNIZE NO ADMIN: HCPCS | Performed by: PODIATRIST

## 2024-11-04 PROCEDURE — 99203 OFFICE O/P NEW LOW 30 MIN: CPT | Performed by: PODIATRIST

## 2024-11-04 PROCEDURE — 11057 PARNG/CUTG B9 HYPRKR LES >4: CPT | Performed by: PODIATRIST

## 2024-11-04 PROCEDURE — G8417 CALC BMI ABV UP PARAM F/U: HCPCS | Performed by: PODIATRIST

## 2024-11-04 PROCEDURE — G8427 DOCREV CUR MEDS BY ELIG CLIN: HCPCS | Performed by: PODIATRIST

## 2024-11-04 RX ORDER — UREA 200 MG/G
CREAM TOPICAL
Qty: 480 G | Refills: 3 | Status: SHIPPED | OUTPATIENT
Start: 2024-11-04

## 2024-11-04 ASSESSMENT — ENCOUNTER SYMPTOMS
COLOR CHANGE: 0
BACK PAIN: 0
DIARRHEA: 0
NAUSEA: 0
SHORTNESS OF BREATH: 0

## 2024-11-04 NOTE — PROGRESS NOTES
Carlos Chu is a 34 y.o. male who presents to the office today with chief complaint of painful callouses to both feet.  Chief Complaint   Patient presents with    Callouses     Callous trim b/l   Symptoms began greater than 10 year(s) ago. Patient denies injury to the feet. Patient states that the callouses cause pain with shoe gear and ambulation. Pain is rated 6 out of 10 at it's worst and is described as intermittent. Treatments prior to today's visit include: None.      Allergies   Allergen Reactions    Seasonal        Past Medical History:   Diagnosis Date    ADHD (attention deficit hyperactivity disorder) 2000    IEP starting in 4th grade    Allergic rhinitis 1998    Anxiety 2019    Arthritis     Asthma 1998    Bipolar disorder (HCC) 2023    Being looked into    Chronic pain of right knee     COVID     states he has Covid 5 times    Depression 2019    Gout     Hypertension First told in 2021    Obesity 2015    Sleep apnea 2021    uses CPAP    Substance abuse ([facility]) THC between 0325-9690    No longer smoke    Vitamin D deficiency        Prior to Admission medications    Medication Sig Start Date End Date Taking? Authorizing Provider   urea (CARMOL) 20 % cream Apply topically as needed. 11/4/24  Yes Jason Andrew DPM   vitamin D (ERGOCALCIFEROL) 1.25 MG (63519 UT) CAPS capsule Take 1 capsule by mouth once a week 10/17/24  Yes Carmelina Garcia APRN - CNP   ferrous sulfate (IRON 325) 325 (65 Fe) MG tablet Take 1 tablet by mouth daily (with breakfast) 10/17/24  Yes Carmelina Garcia APRN - CNP   guanFACINE HCl 2 MG TABS Take 1 tablet by mouth every morning 10/4/24  Yes Provider, MD Marianna   albuterol (PROVENTIL) (2.5 MG/3ML) 0.083% nebulizer solution Take 3 mLs by nebulization every 4 hours as needed for Wheezing or Shortness of Breath 10/10/24  Yes Gretchen Luciano APRN - CNP   fluticasone (FLONASE) 50 MCG/ACT nasal spray 2 sprays by Nasal route daily 10/10/24  Yes Gretchen Luciano APRN - CNP

## 2024-11-08 RX ORDER — PANTOPRAZOLE SODIUM 40 MG/1
40 TABLET, DELAYED RELEASE ORAL
Qty: 30 TABLET | Refills: 0 | Status: SHIPPED | OUTPATIENT
Start: 2024-11-08

## 2024-11-13 ENCOUNTER — TELEPHONE (OUTPATIENT)
Dept: INTERNAL MEDICINE CLINIC | Age: 34
End: 2024-11-13

## 2024-11-13 ENCOUNTER — OFFICE VISIT (OUTPATIENT)
Dept: FAMILY MEDICINE CLINIC | Age: 34
End: 2024-11-13
Payer: COMMERCIAL

## 2024-11-13 VITALS
TEMPERATURE: 99.1 F | OXYGEN SATURATION: 97 % | SYSTOLIC BLOOD PRESSURE: 148 MMHG | DIASTOLIC BLOOD PRESSURE: 85 MMHG | HEART RATE: 90 BPM

## 2024-11-13 DIAGNOSIS — J45.21 MILD INTERMITTENT ASTHMA WITH EXACERBATION: ICD-10-CM

## 2024-11-13 DIAGNOSIS — J01.90 ACUTE BACTERIAL SINUSITIS: Primary | ICD-10-CM

## 2024-11-13 DIAGNOSIS — B96.89 ACUTE BACTERIAL SINUSITIS: Primary | ICD-10-CM

## 2024-11-13 PROCEDURE — 3077F SYST BP >= 140 MM HG: CPT | Performed by: NURSE PRACTITIONER

## 2024-11-13 PROCEDURE — 1036F TOBACCO NON-USER: CPT | Performed by: NURSE PRACTITIONER

## 2024-11-13 PROCEDURE — G8417 CALC BMI ABV UP PARAM F/U: HCPCS | Performed by: NURSE PRACTITIONER

## 2024-11-13 PROCEDURE — 99213 OFFICE O/P EST LOW 20 MIN: CPT | Performed by: NURSE PRACTITIONER

## 2024-11-13 PROCEDURE — 3079F DIAST BP 80-89 MM HG: CPT | Performed by: NURSE PRACTITIONER

## 2024-11-13 PROCEDURE — G8484 FLU IMMUNIZE NO ADMIN: HCPCS | Performed by: NURSE PRACTITIONER

## 2024-11-13 PROCEDURE — G8427 DOCREV CUR MEDS BY ELIG CLIN: HCPCS | Performed by: NURSE PRACTITIONER

## 2024-11-13 RX ORDER — GUAIFENESIN 600 MG/1
600 TABLET, EXTENDED RELEASE ORAL 2 TIMES DAILY
Qty: 30 TABLET | Refills: 0 | Status: SHIPPED | OUTPATIENT
Start: 2024-11-13 | End: 2024-11-28

## 2024-11-13 RX ORDER — PREDNISONE 20 MG/1
20 TABLET ORAL 2 TIMES DAILY
Qty: 10 TABLET | Refills: 0 | Status: SHIPPED | OUTPATIENT
Start: 2024-11-13 | End: 2024-11-18

## 2024-11-13 RX ORDER — IBUPROFEN 800 MG/1
800 TABLET, FILM COATED ORAL EVERY 8 HOURS PRN
Qty: 30 TABLET | Refills: 0 | Status: SHIPPED | OUTPATIENT
Start: 2024-11-13 | End: 2024-11-23

## 2024-11-13 ASSESSMENT — ENCOUNTER SYMPTOMS
SWOLLEN GLANDS: 0
CHEST TIGHTNESS: 0
CHOKING: 0
SORE THROAT: 0
SINUS COMPLAINT: 1
SINUS PRESSURE: 1
SHORTNESS OF BREATH: 0
COUGH: 1
WHEEZING: 0
HOARSE VOICE: 0
STRIDOR: 0

## 2024-11-13 NOTE — PROGRESS NOTES
release tablet    predniSONE (DELTASONE) 20 MG tablet    amoxicillin-clavulanate (AUGMENTIN) 875-125 MG per tablet      2. Mild intermittent asthma with exacerbation  predniSONE (DELTASONE) 20 MG tablet          Plan:   Well-appearing male with a history of sinus issues presents for typical sinus infection symptoms for him reports the postnasal drip is causing him to cough and flaring his asthma as well  Mom had a extra dose of prednisone and gave that to him which alleviated asthma symptoms.  He does not need a refill on his nebulizer or rescue inhaler  Based on symptom duration severity and yellow postnasal drip will treat as bacterial sinusitis with asthma exacerbation symptoms  Medications as prescribed  Patient declines COVID and flu test  1. Acute bacterial sinusitis  -     guaiFENesin (MUCINEX) 600 MG extended release tablet; Take 1 tablet by mouth 2 times daily for 15 days, Disp-30 tablet, R-0Normal  -     predniSONE (DELTASONE) 20 MG tablet; Take 1 tablet by mouth 2 times daily for 5 days, Disp-10 tablet, R-0Normal  -     amoxicillin-clavulanate (AUGMENTIN) 875-125 MG per tablet; Take 1 tablet by mouth 2 times daily for 10 days, Disp-20 tablet, R-0Normal  2. Mild intermittent asthma with exacerbation  -     predniSONE (DELTASONE) 20 MG tablet; Take 1 tablet by mouth 2 times daily for 5 days, Disp-10 tablet, R-0Normal       Return for Make an Appt. with your Primary Care in 1 week.    Orders Placed This Encounter   Medications    guaiFENesin (MUCINEX) 600 MG extended release tablet     Sig: Take 1 tablet by mouth 2 times daily for 15 days     Dispense:  30 tablet     Refill:  0    predniSONE (DELTASONE) 20 MG tablet     Sig: Take 1 tablet by mouth 2 times daily for 5 days     Dispense:  10 tablet     Refill:  0    amoxicillin-clavulanate (AUGMENTIN) 875-125 MG per tablet     Sig: Take 1 tablet by mouth 2 times daily for 10 days     Dispense:  20 tablet     Refill:  0    ibuprofen (IBU) 800 MG tablet     Sig:

## 2024-11-13 NOTE — TELEPHONE ENCOUNTER
Patient contacted office informing that he has his usual yearly Sinus infection , nasal drainage, sinus pressure , and chest congestion and requested Rx for Mucin X , cough syrup and prednisone.    Informed that he will have to be assessed with an appointment in order to be prescribed anything. Advised patient to [facility] due to no availability to schedule in office at this time.

## 2024-12-05 RX ORDER — PANTOPRAZOLE SODIUM 40 MG/1
40 TABLET, DELAYED RELEASE ORAL
Qty: 30 TABLET | Refills: 0 | Status: SHIPPED | OUTPATIENT
Start: 2024-12-05

## 2025-01-06 RX ORDER — PANTOPRAZOLE SODIUM 40 MG/1
40 TABLET, DELAYED RELEASE ORAL
Qty: 30 TABLET | Refills: 0 | Status: SHIPPED | OUTPATIENT
Start: 2025-01-06

## 2025-02-11 ENCOUNTER — OFFICE VISIT (OUTPATIENT)
Dept: FAMILY MEDICINE CLINIC | Age: 35
End: 2025-02-11
Payer: COMMERCIAL

## 2025-02-11 VITALS
TEMPERATURE: 99.9 F | SYSTOLIC BLOOD PRESSURE: 172 MMHG | HEART RATE: 90 BPM | DIASTOLIC BLOOD PRESSURE: 72 MMHG | OXYGEN SATURATION: 96 %

## 2025-02-11 DIAGNOSIS — I10 ELEVATED BLOOD PRESSURE READING IN OFFICE WITH DIAGNOSIS OF HYPERTENSION: ICD-10-CM

## 2025-02-11 DIAGNOSIS — R68.89 FLU-LIKE SYMPTOMS: ICD-10-CM

## 2025-02-11 DIAGNOSIS — J01.40 ACUTE NON-RECURRENT PANSINUSITIS: Primary | ICD-10-CM

## 2025-02-11 LAB
INFLUENZA A ANTIGEN, POC: NEGATIVE
INFLUENZA B ANTIGEN, POC: NEGATIVE
LOT EXPIRE DATE: NORMAL
LOT KIT NUMBER: NORMAL
SARS-COV-2, POC: NORMAL
VALID INTERNAL CONTROL: NORMAL
VENDOR AND KIT NAME POC: NORMAL

## 2025-02-11 PROCEDURE — 1036F TOBACCO NON-USER: CPT

## 2025-02-11 PROCEDURE — 3077F SYST BP >= 140 MM HG: CPT

## 2025-02-11 PROCEDURE — G8427 DOCREV CUR MEDS BY ELIG CLIN: HCPCS

## 2025-02-11 PROCEDURE — G8417 CALC BMI ABV UP PARAM F/U: HCPCS

## 2025-02-11 PROCEDURE — 3078F DIAST BP <80 MM HG: CPT

## 2025-02-11 PROCEDURE — 99214 OFFICE O/P EST MOD 30 MIN: CPT

## 2025-02-11 PROCEDURE — 87428 SARSCOV & INF VIR A&B AG IA: CPT

## 2025-02-11 RX ORDER — PREDNISONE 20 MG/1
40 TABLET ORAL DAILY
Qty: 10 TABLET | Refills: 0 | Status: SHIPPED | OUTPATIENT
Start: 2025-02-11 | End: 2025-02-16

## 2025-02-11 ASSESSMENT — ENCOUNTER SYMPTOMS
ABDOMINAL PAIN: 0
EYE PAIN: 0
EYE REDNESS: 0
CHEST TIGHTNESS: 0
RHINORRHEA: 1
SORE THROAT: 0
NAUSEA: 0
SINUS PRESSURE: 1
VOMITING: 0
COUGH: 1
SINUS PAIN: 1
EYE ITCHING: 0
SHORTNESS OF BREATH: 0
WHEEZING: 0
SWOLLEN GLANDS: 0
DIARRHEA: 0

## 2025-02-11 NOTE — PROGRESS NOTES
[facility] PHYSICIANS [facility] WALK-IN FAMILY MEDICINE  [address]  [address]  [facility] [address]  Dept: [phone]  Dept Fax: [phone]    Carlos Chu is a 34 y.o. male who presents to the urgent care today for his medical conditions/complaints as notedbelow.  Carlos Chu is c/o of Sinus Problem (Onset for a few days with coughing, nasal congestion, PND, and headache. Otc liquid cold /flu , inhaler, flonase and sinus pill with no relief )      HPI:     Patient presents to the Walk In Clinic for evaluation of URI symptoms    Patient Care Team:  Gretchen Luciano APRN - CNP as PCP - General (Internal Medicine)  Gretchen Luciano APRN - CNP as PCP - Empaneled Provider      Cold Symptoms   This is a new problem. The current episode started in the past 7 days. The problem has been rapidly worsening. Associated symptoms include congestion, coughing, ear pain, headaches, a plugged ear sensation, rhinorrhea and sinus pain. Pertinent negatives include no abdominal pain, chest pain, diarrhea, dysuria, joint pain, joint swelling, nausea, neck pain, rash, sneezing, sore throat, swollen glands, vomiting or wheezing. Treatments tried: OTC cough/cold medicine. The treatment provided no relief.       Past Medical History:   Diagnosis Date    ADHD (attention deficit hyperactivity disorder) 2000    IEP starting in 4th grade    Allergic rhinitis 1998    Anxiety 2019    Arthritis     Asthma 1998    Bipolar disorder ([facility]) 2023    Being looked into    Chronic pain of right knee     COVID     states he has Covid 5 times    Depression 2019    Gout     Hypertension First told in 2021    Obesity 2015    Sleep apnea 2021    uses CPAP    Substance abuse ([facility]) THC between 4550-5129    No longer smoke    Vitamin D deficiency         Current Outpatient Medications   Medication Sig Dispense Refill    amoxicillin-clavulanate (AUGMENTIN) 875-125 MG per tablet Take 1 tablet by mouth 2

## 2025-02-13 RX ORDER — PANTOPRAZOLE SODIUM 40 MG/1
40 TABLET, DELAYED RELEASE ORAL
Qty: 30 TABLET | Refills: 0 | Status: SHIPPED | OUTPATIENT
Start: 2025-02-13

## 2025-03-13 DIAGNOSIS — J45.30 MILD PERSISTENT ASTHMA, UNSPECIFIED WHETHER COMPLICATED: ICD-10-CM

## 2025-03-13 RX ORDER — PANTOPRAZOLE SODIUM 40 MG/1
40 TABLET, DELAYED RELEASE ORAL
Qty: 30 TABLET | Refills: 0 | Status: SHIPPED | OUTPATIENT
Start: 2025-03-13

## 2025-03-13 RX ORDER — ALBUTEROL SULFATE 90 UG/1
INHALANT RESPIRATORY (INHALATION)
Qty: 18 G | Refills: 2 | Status: SHIPPED | OUTPATIENT
Start: 2025-03-13

## 2025-03-13 NOTE — TELEPHONE ENCOUNTER
Last visit: 10/10/24  Last Med refill: 10/10/24  Does patient have enough medication for 72 hours: No:     Next Visit Date:  Future Appointments   Date Time Provider Department Center   4/10/2025 10:00 AM Gretchen Luciano APRN - CNP [facility] DEP   4/11/2025  2:15 PM Lio Pimentel PA-C [facility]   4/16/2025  3:45 PM Alannah Gonsalez MD [facility]       Health Maintenance   Topic Date Due    COVID-19 Vaccine (3 - 2024-25 season) 09/01/2024    Depression Monitoring  03/05/2025    DTaP/Tdap/Td vaccine (8 - Td or Tdap) 04/21/2030    Hepatitis B vaccine  Completed    Polio vaccine  Completed    Flu vaccine  Completed    Pneumococcal 0-49 years Vaccine  Completed    Hepatitis C screen  Completed    HIV screen  Completed    Hepatitis A vaccine  Aged Out    Hib vaccine  Aged Out    HPV vaccine  Aged Out    Meningococcal (ACWY) vaccine  Aged Out    Meningococcal B vaccine  Aged Out    Varicella vaccine  Discontinued    Depression Screen  Discontinued       Hemoglobin A1C (%)   Date Value   10/11/2024 4.9   12/22/2023 4.5   10/15/2015 4.7             ( goal A1C is < 7)   No components found for: \"LABMICR\"  No components found for: \"LDLCHOLESTEROL\", \"LDLCALC\"    (goal LDL is <100)   AST (U/L)   Date Value   10/11/2024 25     ALT (U/L)   Date Value   10/11/2024 22     BUN (mg/dL)   Date Value   10/11/2024 18     BP Readings from Last 3 Encounters:   02/11/25 (!) 172/72   11/13/24 (!) 148/85   10/10/24 132/82          (goal 120/80)    All Future Testing planned in [facility]  Lab Frequency Next Occurrence   FL Upper Gi W Air Contrast Once 05/16/2024               Patient Active Problem List:     Morbid obesity     Anxiety     Insomnia     Dyspnea, unspecified     Allergic rhinitis     ADHD (attention deficit hyperactivity disorder)     Sleep apnea in adult     Hypertension

## 2025-03-25 ENCOUNTER — TELEPHONE (OUTPATIENT)
Dept: INTERNAL MEDICINE CLINIC | Age: 35
End: 2025-03-25

## 2025-03-25 NOTE — TELEPHONE ENCOUNTER
Received call back. Received verbal permission to speak to patients mother. Informed patients mother we do not do social security disability. She would like patients records faxed over to social security office. Informed her we cannot release records out of our office and provided her with [facility] records number. Patients mother verbalized understanding.

## 2025-03-25 NOTE — TELEPHONE ENCOUNTER
Received paperwork in regards to social security disability.    Attempted to contact patient to inform him our office does not do social security disability, no answer, LVM.

## 2025-03-28 NOTE — TELEPHONE ENCOUNTER
Spoke with [facility] [employer] to inform them that they will have to go through [facility] to get patients records we do not fill out social security forms.

## 2025-04-09 RX ORDER — PANTOPRAZOLE SODIUM 40 MG/1
40 TABLET, DELAYED RELEASE ORAL
Qty: 30 TABLET | Refills: 5 | Status: SHIPPED | OUTPATIENT
Start: 2025-04-09

## 2025-04-10 ENCOUNTER — OFFICE VISIT (OUTPATIENT)
Dept: INTERNAL MEDICINE CLINIC | Age: 35
End: 2025-04-10
Payer: COMMERCIAL

## 2025-04-10 VITALS
DIASTOLIC BLOOD PRESSURE: 84 MMHG | OXYGEN SATURATION: 98 % | BODY MASS INDEX: 64.83 KG/M2 | SYSTOLIC BLOOD PRESSURE: 130 MMHG | WEIGHT: 315 LBS | HEART RATE: 64 BPM

## 2025-04-10 DIAGNOSIS — E66.01 MORBID OBESITY WITH BMI OF 60.0-69.9, ADULT: ICD-10-CM

## 2025-04-10 DIAGNOSIS — J30.2 SEASONAL ALLERGIC RHINITIS, UNSPECIFIED TRIGGER: ICD-10-CM

## 2025-04-10 DIAGNOSIS — F41.9 ANXIETY: ICD-10-CM

## 2025-04-10 DIAGNOSIS — J45.30 MILD PERSISTENT ASTHMA, UNSPECIFIED WHETHER COMPLICATED: Primary | ICD-10-CM

## 2025-04-10 PROCEDURE — G8427 DOCREV CUR MEDS BY ELIG CLIN: HCPCS | Performed by: NURSE PRACTITIONER

## 2025-04-10 PROCEDURE — 99213 OFFICE O/P EST LOW 20 MIN: CPT | Performed by: NURSE PRACTITIONER

## 2025-04-10 PROCEDURE — 1036F TOBACCO NON-USER: CPT | Performed by: NURSE PRACTITIONER

## 2025-04-10 PROCEDURE — G8417 CALC BMI ABV UP PARAM F/U: HCPCS | Performed by: NURSE PRACTITIONER

## 2025-04-10 PROCEDURE — 3075F SYST BP GE 130 - 139MM HG: CPT | Performed by: NURSE PRACTITIONER

## 2025-04-10 PROCEDURE — 3079F DIAST BP 80-89 MM HG: CPT | Performed by: NURSE PRACTITIONER

## 2025-04-10 RX ORDER — ALBUTEROL SULFATE 90 UG/1
2 INHALANT RESPIRATORY (INHALATION) EVERY 4 HOURS PRN
Qty: 18 G | Refills: 2 | Status: SHIPPED | OUTPATIENT
Start: 2025-04-10

## 2025-04-10 RX ORDER — BUSPIRONE HYDROCHLORIDE 15 MG/1
TABLET ORAL
COMMUNITY
Start: 2025-03-13

## 2025-04-10 RX ORDER — FLUTICASONE PROPIONATE 50 MCG
2 SPRAY, SUSPENSION (ML) NASAL DAILY
Qty: 48 G | Refills: 1 | Status: SHIPPED | OUTPATIENT
Start: 2025-04-10

## 2025-04-10 RX ORDER — SERTRALINE HYDROCHLORIDE 100 MG/1
TABLET, FILM COATED ORAL
COMMUNITY
Start: 2025-03-31

## 2025-04-10 RX ORDER — HYDROXYZINE PAMOATE 100 MG
100 CAPSULE ORAL 3 TIMES DAILY
COMMUNITY
Start: 2025-01-31

## 2025-04-10 RX ORDER — ALBUTEROL SULFATE 0.83 MG/ML
2.5 SOLUTION RESPIRATORY (INHALATION) EVERY 4 HOURS PRN
Qty: 120 EACH | Refills: 3 | Status: SHIPPED | OUTPATIENT
Start: 2025-04-10

## 2025-04-10 RX ORDER — MONTELUKAST SODIUM 10 MG/1
10 TABLET ORAL NIGHTLY
Qty: 90 TABLET | Refills: 1 | Status: SHIPPED | OUTPATIENT
Start: 2025-04-10

## 2025-04-10 SDOH — ECONOMIC STABILITY: FOOD INSECURITY: WITHIN THE PAST 12 MONTHS, THE FOOD YOU BOUGHT JUST DIDN'T LAST AND YOU DIDN'T HAVE MONEY TO GET MORE.: NEVER TRUE

## 2025-04-10 SDOH — ECONOMIC STABILITY: FOOD INSECURITY: WITHIN THE PAST 12 MONTHS, YOU WORRIED THAT YOUR FOOD WOULD RUN OUT BEFORE YOU GOT MONEY TO BUY MORE.: NEVER TRUE

## 2025-04-10 ASSESSMENT — ENCOUNTER SYMPTOMS
TROUBLE SWALLOWING: 0
WHEEZING: 0
COUGH: 1
EYE DISCHARGE: 0
ABDOMINAL PAIN: 0
CONSTIPATION: 0
DIARRHEA: 0
RHINORRHEA: 1
SHORTNESS OF BREATH: 0
NAUSEA: 0

## 2025-04-10 ASSESSMENT — PATIENT HEALTH QUESTIONNAIRE - PHQ9
2. FEELING DOWN, DEPRESSED OR HOPELESS: SEVERAL DAYS
3. TROUBLE FALLING OR STAYING ASLEEP: NOT AT ALL
10. IF YOU CHECKED OFF ANY PROBLEMS, HOW DIFFICULT HAVE THESE PROBLEMS MADE IT FOR YOU TO DO YOUR WORK, TAKE CARE OF THINGS AT HOME, OR GET ALONG WITH OTHER PEOPLE: NOT DIFFICULT AT ALL
8. MOVING OR SPEAKING SO SLOWLY THAT OTHER PEOPLE COULD HAVE NOTICED. OR THE OPPOSITE, BEING SO FIGETY OR RESTLESS THAT YOU HAVE BEEN MOVING AROUND A LOT MORE THAN USUAL: NOT AT ALL
SUM OF ALL RESPONSES TO PHQ QUESTIONS 1-9: 2
4. FEELING TIRED OR HAVING LITTLE ENERGY: NOT AT ALL
7. TROUBLE CONCENTRATING ON THINGS, SUCH AS READING THE NEWSPAPER OR WATCHING TELEVISION: NOT AT ALL
SUM OF ALL RESPONSES TO PHQ QUESTIONS 1-9: 2
SUM OF ALL RESPONSES TO PHQ QUESTIONS 1-9: 2
5. POOR APPETITE OR OVEREATING: NOT AT ALL
SUM OF ALL RESPONSES TO PHQ QUESTIONS 1-9: 2
6. FEELING BAD ABOUT YOURSELF - OR THAT YOU ARE A FAILURE OR HAVE LET YOURSELF OR YOUR FAMILY DOWN: NOT AT ALL
1. LITTLE INTEREST OR PLEASURE IN DOING THINGS: SEVERAL DAYS
9. THOUGHTS THAT YOU WOULD BE BETTER OFF DEAD, OR OF HURTING YOURSELF: NOT AT ALL

## 2025-04-10 NOTE — PROGRESS NOTES
\"Have you been to the ER, urgent care clinic since your last visit?  Hospitalized since your last visit?\"    YES    “Have you seen or consulted any other health care providers outside our system since your last visit?”    YES                 [facility]  [address] [address]  Dept: [phone]  Dept Fax: [phone]    Office Progress/Follow Up Note  Date of patient's visit: 4/10/2025   Patient's Name:  Carlos Chu  YOB: 1990            Patient Care Team:  Gretchen Luciano APRN - CNP as PCP - General (Internal Medicine)  Gretchen Luciano APRN - CNP as PCP - Empaneled Provider    REASON FOR VISIT: Asthma, Allergies, and Obesity        HISTORY OF PRESENT ILLNESS:      History was obtained from the patient. Carlos Chu is a 34 y.o. is here for Asthma, Allergies, and Obesity    HPI  Asthma- reports doing well since starting Spiriva. Only needing rescue inhaler about once a week. Did see pulm previously to get clearance for gastric bypass.   Feels that seasonal allergies sx are worsening, would like new referral to allergist as he didn't schedule with them last time referral was placed.     Has appt with GI for follow up of delayed gastric empyting/to get cleared for gastric bypass  Has clearance from pulm and cardiolgoy    Has had a lot of personal stress \"with the \" and with his 10 yo meg who had to have emergency surgery because she wasn't fixed, and then tore her ACL and needs surgery to fix that but vet said it would cost $6k   He is still working with behavioral health and counselor, meds recently adjusted.    Patient Active Problem List   Diagnosis    Morbid obesity    Anxiety    Insomnia    Dyspnea, unspecified    Allergic rhinitis    ADHD (attention deficit hyperactivity disorder)    Sleep apnea in adult    Hypertension       Allergies   Allergen Reactions    Seasonal          MEDICATIONS:     Current Outpatient Medications

## 2025-04-16 ENCOUNTER — OFFICE VISIT (OUTPATIENT)
Dept: GASTROENTEROLOGY | Age: 35
End: 2025-04-16
Payer: COMMERCIAL

## 2025-04-16 VITALS
DIASTOLIC BLOOD PRESSURE: 79 MMHG | RESPIRATION RATE: 18 BRPM | SYSTOLIC BLOOD PRESSURE: 132 MMHG | WEIGHT: 315 LBS | OXYGEN SATURATION: 96 % | TEMPERATURE: 97.6 F | BODY MASS INDEX: 63.88 KG/M2 | HEART RATE: 83 BPM

## 2025-04-16 DIAGNOSIS — K21.9 GASTROESOPHAGEAL REFLUX DISEASE, UNSPECIFIED WHETHER ESOPHAGITIS PRESENT: ICD-10-CM

## 2025-04-16 DIAGNOSIS — R09.A2 GLOBUS SENSATION: ICD-10-CM

## 2025-04-16 DIAGNOSIS — R13.19 ESOPHAGEAL DYSPHAGIA: ICD-10-CM

## 2025-04-16 DIAGNOSIS — K30 DELAYED GASTRIC EMPTYING: Primary | ICD-10-CM

## 2025-04-16 PROCEDURE — 1036F TOBACCO NON-USER: CPT | Performed by: INTERNAL MEDICINE

## 2025-04-16 PROCEDURE — 3078F DIAST BP <80 MM HG: CPT | Performed by: INTERNAL MEDICINE

## 2025-04-16 PROCEDURE — G8427 DOCREV CUR MEDS BY ELIG CLIN: HCPCS | Performed by: INTERNAL MEDICINE

## 2025-04-16 PROCEDURE — G8417 CALC BMI ABV UP PARAM F/U: HCPCS | Performed by: INTERNAL MEDICINE

## 2025-04-16 PROCEDURE — 3075F SYST BP GE 130 - 139MM HG: CPT | Performed by: INTERNAL MEDICINE

## 2025-04-16 PROCEDURE — 99214 OFFICE O/P EST MOD 30 MIN: CPT | Performed by: INTERNAL MEDICINE

## 2025-04-16 ASSESSMENT — ENCOUNTER SYMPTOMS
CHOKING: 0
COUGH: 0
ABDOMINAL PAIN: 1
RECTAL PAIN: 0
WHEEZING: 0
NAUSEA: 0
DIARRHEA: 0
VOICE CHANGE: 0
ABDOMINAL DISTENTION: 1
TROUBLE SWALLOWING: 0
SORE THROAT: 0
VOMITING: 0
CONSTIPATION: 0
ANAL BLEEDING: 0
BLOOD IN STOOL: 0

## 2025-04-16 NOTE — PROGRESS NOTES
GI CLINIC FOLLOW UP    INTERVAL HISTORY:   No referring provider defined for this encounter.    Chief Complaint   Patient presents with    Follow-up     6 month follow up with complaints of abdominal bloating and pain.            HISTORY OF PRESENT ILLNESS: Mr.Corbin SAMEERA Chu is a 34 y.o. male , referred for evaluation of  GERD, dysphagia, epigastric pain, nausea.      Here for f/u.  EGD 2/29/24 showing food in the stomach for which GES was ordered  Completed 9/30/24 showing some delayed gastric emptying of liquids.   On reglan and protonix   Doing well  No N/v   no abd pain   no melena/hematemsis/hematochezia  No dysphagia/odynophagia   no wt loss   no diarrhea /constipation  Labs normal CBC/LFTs/TSH         .    Past Medical,Family, and Social History reviewed and does contribute to the patient presentingcondition.    I did review all the labs results available for the labs which were ordered by the primary care physician, and the other consultants, we search on [employer] at [facility] and all the available care everywhere epic    I did review all the imaging studies of the abdomen available on EMR, ordered by the primary care physician and the other consultant    I did review all the pathology from the biopsies done on the previous endoscopies    Patient's PMH/PSH,SH,PSYCH Hx, MEDs, ALLERGIES, and ROS were all reviewed and updated in the appropriate sections.    PAST MEDICAL HISTORY:  Past Medical History:   Diagnosis Date    ADHD (attention deficit hyperactivity disorder) 2000    IEP starting in 4th grade    Allergic rhinitis 1998    Anxiety 2019    Arthritis     Asthma 1998    Bipolar disorder 2023    Being looked into    Chronic pain of right knee     COVID     states he has Covid 5 times    Depression 2019    Gout     Hypertension First told in 2021    Obesity 2015    Sleep apnea 2021    uses CPAP    Substance abuse THC between 8131-8305    No longer smoke    Vitamin D deficiency        Past Surgical History:

## 2025-05-06 ENCOUNTER — OFFICE VISIT (OUTPATIENT)
Dept: PODIATRY | Age: 35
End: 2025-05-06
Payer: COMMERCIAL

## 2025-05-06 VITALS — HEIGHT: 72 IN | BODY MASS INDEX: 42.66 KG/M2 | WEIGHT: 315 LBS

## 2025-05-06 DIAGNOSIS — L85.3 XEROSIS CUTIS: ICD-10-CM

## 2025-05-06 DIAGNOSIS — M79.671 PAIN IN RIGHT FOOT: ICD-10-CM

## 2025-05-06 DIAGNOSIS — M79.672 PAIN IN LEFT FOOT: ICD-10-CM

## 2025-05-06 DIAGNOSIS — L84 CORNS AND CALLOSITIES: ICD-10-CM

## 2025-05-06 PROCEDURE — G8417 CALC BMI ABV UP PARAM F/U: HCPCS | Performed by: PODIATRIST

## 2025-05-06 PROCEDURE — 1036F TOBACCO NON-USER: CPT | Performed by: PODIATRIST

## 2025-05-06 PROCEDURE — 99213 OFFICE O/P EST LOW 20 MIN: CPT | Performed by: PODIATRIST

## 2025-05-06 PROCEDURE — G8427 DOCREV CUR MEDS BY ELIG CLIN: HCPCS | Performed by: PODIATRIST

## 2025-05-06 PROCEDURE — 11057 PARNG/CUTG B9 HYPRKR LES >4: CPT | Performed by: PODIATRIST

## 2025-05-06 RX ORDER — HYDROXYZINE HYDROCHLORIDE 50 MG/1
TABLET, FILM COATED ORAL
COMMUNITY
Start: 2025-05-01

## 2025-05-07 RX ORDER — UREA 200 MG/G
CREAM TOPICAL
Qty: 480 G | Refills: 3 | Status: SHIPPED | OUTPATIENT
Start: 2025-05-07

## 2025-05-07 ASSESSMENT — ENCOUNTER SYMPTOMS
SHORTNESS OF BREATH: 0
COLOR CHANGE: 0
NAUSEA: 0
BACK PAIN: 0
DIARRHEA: 0

## 2025-05-08 NOTE — PROGRESS NOTES
[facility] Podiatry  Return Patient Progress Note    Subjective: Carlos Chu 34 y.o. male that presents for follow up evaluation of painful calluses both feet.  Chief Complaint   Patient presents with    Callouses     B/l callous trim    Patient's treatment thus far has included debridement.  Pain is rated 4 out of 10 and is described as intermittent. Patient has been following my prescribed course of therapy as instructed.     Review of Systems   Constitutional:  Negative for activity change, appetite change, chills, diaphoresis, fatigue and fever.   Respiratory:  Negative for shortness of breath.    Cardiovascular:  Negative for leg swelling.   Gastrointestinal:  Negative for diarrhea and nausea.   Endocrine: Negative for cold intolerance, heat intolerance and polyuria.   Musculoskeletal:  Negative for arthralgias, back pain, gait problem, joint swelling and myalgias.   Skin:  Negative for color change, pallor, rash and wound.   Allergic/Immunologic: Negative for environmental allergies and food allergies.   Neurological:  Negative for dizziness, weakness, light-headedness and numbness.   Hematological:  Does not bruise/bleed easily.   Psychiatric/Behavioral:  Negative for behavioral problems, confusion and self-injury. The patient is not nervous/anxious.        Objective: Clinical evaluation of the patient reveals hyperkeratotic, dry, fissured skin to the bilateral feet at the hallux, the rim of the heels, and to the ball of the feet.  There is pain with palpation to these areas.  No open wounds are noted. There is hyperkeratotic tissue formation noted x 3 to each foot. There is pain with direct palpation of the lesion(s). Debridement of the lesion(s) with a fifteen blade does not reveal a central core. No signs of bacterial infection are noted to the lesion(s).     Assessment:    Diagnosis Orders   1. Corns and callosities  urea (CARMOL) 20 % cream    TRIM HYPERKERATOTIC SKIN LESION,>4      2. Xerosis

## (undated) DEVICE — FORCEPS BX L240CM WRK CHN 2.8MM STD CAP W/ NDL MIC MESH

## (undated) DEVICE — DEFENDO AIR WATER SUCTION AND BIOPSY VALVE KIT FOR  OLYMPUS: Brand: DEFENDO AIR/WATER/SUCTION AND BIOPSY VALVE

## (undated) DEVICE — BITEBLOCK 54FR W/ DENT RIM BLOX

## (undated) DEVICE — GLOVE SURG SZ 75 L12IN FNGR THK79MIL GRN LTX FREE

## (undated) DEVICE — ENDO KIT W/SYRINGE: Brand: MEDLINE INDUSTRIES, INC.